# Patient Record
Sex: FEMALE | Race: WHITE | NOT HISPANIC OR LATINO | Employment: PART TIME | URBAN - NONMETROPOLITAN AREA
[De-identification: names, ages, dates, MRNs, and addresses within clinical notes are randomized per-mention and may not be internally consistent; named-entity substitution may affect disease eponyms.]

---
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
## 2021-01-19 ENCOUNTER — LAB (OUTPATIENT)
Dept: LAB | Facility: HOSPITAL | Age: 21
End: 2021-01-19

## 2021-01-19 ENCOUNTER — OFFICE VISIT (OUTPATIENT)
Dept: GASTROENTEROLOGY | Facility: CLINIC | Age: 21
End: 2021-01-19

## 2021-01-19 VITALS
BODY MASS INDEX: 48.48 KG/M2 | WEIGHT: 291 LBS | RESPIRATION RATE: 18 BRPM | HEART RATE: 90 BPM | HEIGHT: 65 IN | DIASTOLIC BLOOD PRESSURE: 77 MMHG | SYSTOLIC BLOOD PRESSURE: 139 MMHG | TEMPERATURE: 96.8 F

## 2021-01-19 DIAGNOSIS — R11.0 NAUSEA: Chronic | ICD-10-CM

## 2021-01-19 DIAGNOSIS — R10.13 EPIGASTRIC PAIN: Chronic | ICD-10-CM

## 2021-01-19 DIAGNOSIS — R10.13 EPIGASTRIC PAIN: Primary | Chronic | ICD-10-CM

## 2021-01-19 DIAGNOSIS — E66.01 CLASS 3 SEVERE OBESITY DUE TO EXCESS CALORIES WITHOUT SERIOUS COMORBIDITY WITH BODY MASS INDEX (BMI) OF 45.0 TO 49.9 IN ADULT (HCC): Chronic | ICD-10-CM

## 2021-01-19 DIAGNOSIS — R19.7 DIARRHEA, UNSPECIFIED TYPE: Chronic | ICD-10-CM

## 2021-01-19 PROBLEM — E66.813 CLASS 3 SEVERE OBESITY DUE TO EXCESS CALORIES WITHOUT SERIOUS COMORBIDITY WITH BODY MASS INDEX (BMI) OF 45.0 TO 49.9 IN ADULT: Status: ACTIVE | Noted: 2021-01-19

## 2021-01-19 LAB
DEPRECATED RDW RBC AUTO: 37.9 FL (ref 37–54)
ERYTHROCYTE [DISTWIDTH] IN BLOOD BY AUTOMATED COUNT: 13.1 % (ref 12.3–15.4)
HCT VFR BLD AUTO: 37.7 % (ref 34–46.6)
HGB BLD-MCNC: 12.8 G/DL (ref 12–15.9)
IGA1 MFR SER: 204 MG/DL (ref 61–348)
MCH RBC QN AUTO: 27.2 PG (ref 26.6–33)
MCHC RBC AUTO-ENTMCNC: 34 G/DL (ref 31.5–35.7)
MCV RBC AUTO: 80.2 FL (ref 79–97)
PLATELET # BLD AUTO: 390 10*3/MM3 (ref 140–450)
PMV BLD AUTO: 9.5 FL (ref 6–12)
RBC # BLD AUTO: 4.7 10*6/MM3 (ref 3.77–5.28)
WBC # BLD AUTO: 7.39 10*3/MM3 (ref 3.4–10.8)

## 2021-01-19 PROCEDURE — 36415 COLL VENOUS BLD VENIPUNCTURE: CPT

## 2021-01-19 PROCEDURE — 82784 ASSAY IGA/IGD/IGG/IGM EACH: CPT

## 2021-01-19 PROCEDURE — 83516 IMMUNOASSAY NONANTIBODY: CPT

## 2021-01-19 PROCEDURE — 80050 GENERAL HEALTH PANEL: CPT

## 2021-01-19 PROCEDURE — 99214 OFFICE O/P EST MOD 30 MIN: CPT | Performed by: NURSE PRACTITIONER

## 2021-01-19 RX ORDER — PANTOPRAZOLE SODIUM 40 MG/1
TABLET, DELAYED RELEASE ORAL
Qty: 30 TABLET | Refills: 3 | Status: SHIPPED | OUTPATIENT
Start: 2021-01-19 | End: 2021-04-19 | Stop reason: ALTCHOICE

## 2021-01-19 NOTE — PROGRESS NOTES
New Patient Consult      Date: 2021   Patient Name: Nina Munguia  MRN: [id number]  : 2000     Primary Care Provider: Coral Bernal PA    Chief Complaint   Patient presents with   • Abdominal Pain     History of Present Illness: Nina Munguia is a 20 y.o. female who is here today to establish care with Gastroenterology for abdominal pain.     She has been having upper abdominal pain since 2020. She has been to the ER 3 times for the same pain. She has the pain about 2-3 times per week. It seems to be a little better than before, but it has not resolved. At times the pain is mild and at times it is severe. Eating and bowel movements do not affect the pain. She has occasional nausea, no vomiting. No history of heartburn or reflux. No difficulty swallowing. She has not taken anything for the pain, other than Ibuprofen, which she takes as needed.    She has a history of diarrhea for several years. She has 3-4 episodes of loose stool 2-3 times per week. No rectal bleeding.     She has not had colonoscopy or EGD in the past. There is no family history of colon cancer. No family history of GI malignancy.    Subjective      Past Medical History:   Diagnosis Date   • Asthma    • PCOS (polycystic ovarian syndrome)    • Polycystic ovary syndrome      History reviewed. No pertinent surgical history.     Family History   Problem Relation Age of Onset   • Breast cancer Mother    • Prostate cancer Maternal Grandfather    • Irritable bowel syndrome Brother    • Colon cancer Neg Hx      Social History     Socioeconomic History   • Marital status: Single     Spouse name: Not on file   • Number of children: Not on file   • Years of education: Not on file   • Highest education level: Not on file   Tobacco Use   • Smoking status: Former Smoker     Quit date:      Years since quittin.0   • Smokeless tobacco: Never Used   Substance and Sexual Activity   • Alcohol use: Not Currently   •  Drug use: Never   • Sexual activity: Never     Birth control/protection: Abstinence, OCP       Current Outpatient Medications:   •  ondansetron ODT (ZOFRAN-ODT) 4 MG disintegrating tablet, Place 1 tablet on the tongue Every 8 (Eight) Hours As Needed for Nausea., Disp: 12 tablet, Rfl: 0  •  Sulfamethoxazole-Trimethoprim (BACTRIM PO), Take  by mouth Daily., Disp: , Rfl:   •  pantoprazole (PROTONIX) 40 MG EC tablet, 1 po daily in the am 30 minutes before breakfast, Disp: 30 tablet, Rfl: 3    No Known Allergies     The following portions of the patient's history were reviewed and updated as appropriate: allergies, current medications, past family history, past medical history, past social history, past surgical history and problem list.    Objective     Physical Exam  Vitals signs and nursing note reviewed.   Constitutional:       General: She is awake. She is not in acute distress.     Appearance: Normal appearance. She is well-developed. She is not diaphoretic.   HENT:      Head: Normocephalic and atraumatic.      Right Ear: Hearing and external ear normal.      Left Ear: Hearing and external ear normal.      Nose: Nose normal.      Mouth/Throat:      Mouth: Mucous membranes are not pale, not dry and not cyanotic.   Eyes:      General: Lids are normal.         Right eye: No discharge.         Left eye: No discharge.      Conjunctiva/sclera: Conjunctivae normal.   Neck:      Musculoskeletal: Neck supple. No edema.      Thyroid: No thyroid mass or thyromegaly.      Vascular: No JVD.      Trachea: Trachea normal.   Cardiovascular:      Rate and Rhythm: Normal rate and regular rhythm.      Heart sounds: Normal heart sounds and S2 normal. No murmur. No friction rub. No gallop. No S3 sounds.    Pulmonary:      Effort: Pulmonary effort is normal. No respiratory distress.      Breath sounds: Normal breath sounds.   Chest:      Chest wall: No tenderness.   Abdominal:      General: Bowel sounds are normal. There is no  "distension.      Palpations: Abdomen is not rigid. There is no hepatomegaly, splenomegaly or mass.      Tenderness: There is abdominal tenderness in the epigastric area. There is no guarding or rebound.      Hernia: No hernia is present.   Musculoskeletal: Normal range of motion.   Lymphadenopathy:      Cervical: No cervical adenopathy.      Upper Body:      Left upper body: No supraclavicular adenopathy.   Skin:     General: Skin is warm and dry.      Coloration: Skin is not pale.      Findings: No rash.      Nails: There is no clubbing.     Neurological:      Mental Status: She is alert and oriented to person, place, and time.      Cranial Nerves: No cranial nerve deficit.      Sensory: No sensory deficit.   Psychiatric:         Mood and Affect: Mood normal.         Speech: Speech normal.         Behavior: Behavior is cooperative.       Vital Signs:   Vitals:    01/19/21 1352   BP: 139/77   Pulse: 90   Resp: 18   Temp: 96.8 °F (36 °C)   Weight: 132 kg (291 lb)   Height: 165.1 cm (65\")     Results Review:   I have reviewed the patient's new clinical and imaging results.    Admission on 12/06/2020, Discharged on 12/07/2020   Component Date Value Ref Range Status   • Glucose 12/06/2020 104* 65 - 99 mg/dL Final   • BUN 12/06/2020 13  6 - 20 mg/dL Final   • Creatinine 12/06/2020 0.64  0.57 - 1.00 mg/dL Final   • Sodium 12/06/2020 142  136 - 145 mmol/L Final   • Potassium 12/06/2020 3.6  3.5 - 5.2 mmol/L Final   • Chloride 12/06/2020 103  98 - 107 mmol/L Final   • CO2 12/06/2020 27.0  22.0 - 29.0 mmol/L Final   • Calcium 12/06/2020 9.4  8.6 - 10.5 mg/dL Final   • Total Protein 12/06/2020 7.9  6.0 - 8.5 g/dL Final   • Albumin 12/06/2020 4.40  3.50 - 5.20 g/dL Final   • ALT (SGPT) 12/06/2020 22  1 - 33 U/L Final   • AST (SGOT) 12/06/2020 16  1 - 32 U/L Final   • Alkaline Phosphatase 12/06/2020 63  39 - 117 U/L Final   • Total Bilirubin 12/06/2020 <0.2  0.0 - 1.2 mg/dL Final   • eGFR Non African Amer 12/06/2020 120  >60 " mL/min/1.73 Final   • Globulin 12/06/2020 3.5  gm/dL Final   • A/G Ratio 12/06/2020 1.3  g/dL Final   • BUN/Creatinine Ratio 12/06/2020 20.3  7.0 - 25.0 Final   • Anion Gap 12/06/2020 12.0  5.0 - 15.0 mmol/L Final   • Lipase 12/06/2020 71* 13 - 60 U/L Final   • HCG, Urine, QL 12/06/2020 Negative  Negative Final   • Lot Number 12/06/2020 [id number]   Final   • Internal Positive Control 12/06/2020 Positive   Final   • Internal Negative Control 12/06/2020 Negative   Final   • Color, UA 12/06/2020 Yellow  Yellow, Straw Final   • Appearance, UA 12/06/2020 Clear  Clear Final   • pH, UA 12/06/2020 6.0  5.0 - 8.0 Final   • Specific Gravity, UA 12/06/2020 1.024  1.001 - 1.030 Final   • Glucose, UA 12/06/2020 Negative  Negative Final   • Ketones, UA 12/06/2020 Negative  Negative Final   • Bilirubin, UA 12/06/2020 Negative  Negative Final   • Blood, UA 12/06/2020 Large (3+)* Negative Final   • Protein, UA 12/06/2020 Negative  Negative Final   • Leuk Esterase, UA 12/06/2020 Moderate (2+)* Negative Final   • Nitrite, UA 12/06/2020 Negative  Negative Final   • Urobilinogen, UA 12/06/2020 1.0 E.U./dL  0.2 - 1.0 E.U./dL Final   • Lactate 12/06/2020 0.8  0.5 - 2.0 mmol/L Final    Falsely depressed results may occur on samples drawn from patients receiving N-Acetylcysteine (NAC) or Metamizole.   • Triglycerides 12/06/2020 112  0 - 150 mg/dL Final    Falsely depressed results may occur on samples drawn from patients receiving N-Acetylcysteine (NAC) or Metamizole.   • Amylase 12/06/2020 57  28 - 100 U/L Final   • WBC 12/06/2020 10.73  3.40 - 10.80 10*3/mm3 Final   • RBC 12/06/2020 4.87  3.77 - 5.28 10*6/mm3 Final   • Hemoglobin 12/06/2020 13.3  12.0 - 15.9 g/dL Final   • Hematocrit 12/06/2020 40.9  34.0 - 46.6 % Final   • MCV 12/06/2020 84.0  79.0 - 97.0 fL Final   • MCH 12/06/2020 27.3  26.6 - 33.0 pg Final   • MCHC 12/06/2020 32.5  31.5 - 35.7 g/dL Final   • RDW 12/06/2020 12.9  12.3 - 15.4 % Final   • RDW-SD 12/06/2020 39.1  37.0 - 54.0  fl Final   • MPV 12/06/2020 9.0  6.0 - 12.0 fL Final   • Platelets 12/06/2020 408  140 - 450 10*3/mm3 Final   • Neutrophil % 12/06/2020 62.9  42.7 - 76.0 % Final   • Lymphocyte % 12/06/2020 28.6  19.6 - 45.3 % Final   • Monocyte % 12/06/2020 5.2  5.0 - 12.0 % Final   • Eosinophil % 12/06/2020 2.4  0.3 - 6.2 % Final   • Basophil % 12/06/2020 0.6  0.0 - 1.5 % Final   • Immature Grans % 12/06/2020 0.3  0.0 - 0.5 % Final   • Neutrophils, Absolute 12/06/2020 6.75  1.70 - 7.00 10*3/mm3 Final   • Lymphocytes, Absolute 12/06/2020 3.07  0.70 - 3.10 10*3/mm3 Final   • Monocytes, Absolute 12/06/2020 0.56  0.10 - 0.90 10*3/mm3 Final   • Eosinophils, Absolute 12/06/2020 0.26  0.00 - 0.40 10*3/mm3 Final   • Basophils, Absolute 12/06/2020 0.06  0.00 - 0.20 10*3/mm3 Final   • Immature Grans, Absolute 12/06/2020 0.03  0.00 - 0.05 10*3/mm3 Final   • nRBC 12/06/2020 0.0  0.0 - 0.2 /100 WBC Final   • Extra Tube 12/06/2020 hold for add-on   Final    Auto resulted   • Extra Tube 12/06/2020 Hold for add-ons.   Final    Auto resulted.   • RBC, UA 12/06/2020 7-12* None Seen, 0-2 /HPF Final   • WBC, UA 12/06/2020 31-50* None Seen, 0-2 /HPF Final   • Bacteria, UA 12/06/2020 1+* None Seen, Trace /HPF Final   • Squamous Epithelial Cells, UA 12/06/2020 0-2  None Seen, 0-2 /HPF Final   • Hyaline Casts, UA 12/06/2020 0-6  0 - 6 /LPF Final   • Methodology 12/06/2020 Automated Microscopy   Final   • Urine Culture 12/06/2020 >100,000 CFU/mL Mixed Iqra Isolated   Final   Admission on 11/23/2020, Discharged on 11/23/2020   Component Date Value Ref Range Status   • Glucose 11/23/2020 90  65 - 99 mg/dL Final   • BUN 11/23/2020 13  6 - 20 mg/dL Final   • Creatinine 11/23/2020 0.68  0.57 - 1.00 mg/dL Final   • Sodium 11/23/2020 138  136 - 145 mmol/L Final   • Potassium 11/23/2020 3.9  3.5 - 5.2 mmol/L Final   • Chloride 11/23/2020 105  98 - 107 mmol/L Final   • CO2 11/23/2020 25.6  22.0 - 29.0 mmol/L Final   • Calcium 11/23/2020 9.4  8.6 - 10.5 mg/dL  Final   • Total Protein 11/23/2020 7.3  6.0 - 8.5 g/dL Final   • Albumin 11/23/2020 3.90  3.50 - 5.20 g/dL Final   • ALT (SGPT) 11/23/2020 22  1 - 33 U/L Final   • AST (SGOT) 11/23/2020 15  1 - 32 U/L Final   • Alkaline Phosphatase 11/23/2020 57  39 - 117 U/L Final   • Total Bilirubin 11/23/2020 0.2  0.0 - 1.2 mg/dL Final   • eGFR Non African Amer 11/23/2020 111  >60 mL/min/1.73 Final   • Globulin 11/23/2020 3.4  gm/dL Final   • A/G Ratio 11/23/2020 1.1  g/dL Final   • BUN/Creatinine Ratio 11/23/2020 19.1  7.0 - 25.0 Final   • Anion Gap 11/23/2020 7.4  5.0 - 15.0 mmol/L Final   • Lipase 11/23/2020 58  13 - 60 U/L Final   • HCG, Urine QL 11/23/2020 Negative  Negative Final   • Color, UA 11/23/2020 Yellow  Yellow, Straw Final   • Appearance, UA 11/23/2020 Clear  Clear Final   • pH, UA 11/23/2020 8.0  5.0 - 8.0 Final   • Specific Gravity, UA 11/23/2020 1.022  1.005 - 1.030 Final   • Glucose, UA 11/23/2020 Negative  Negative Final   • Ketones, UA 11/23/2020 Negative  Negative Final   • Bilirubin, UA 11/23/2020 Negative  Negative Final   • Blood, UA 11/23/2020 Negative  Negative Final   • Protein, UA 11/23/2020 Negative  Negative Final   • Leuk Esterase, UA 11/23/2020 Negative  Negative Final   • Nitrite, UA 11/23/2020 Negative  Negative Final   • Urobilinogen, UA 11/23/2020 0.2 E.U./dL  0.2 - 1.0 E.U./dL Final   • WBC 11/23/2020 9.36  3.40 - 10.80 10*3/mm3 Final   • RBC 11/23/2020 4.83  3.77 - 5.28 10*6/mm3 Final   • Hemoglobin 11/23/2020 13.0  12.0 - 15.9 g/dL Final   • Hematocrit 11/23/2020 39.5  34.0 - 46.6 % Final   • MCV 11/23/2020 81.8  79.0 - 97.0 fL Final   • MCH 11/23/2020 26.9  26.6 - 33.0 pg Final   • MCHC 11/23/2020 32.9  31.5 - 35.7 g/dL Final   • RDW 11/23/2020 13.1  12.3 - 15.4 % Final   • RDW-SD 11/23/2020 39.2  37.0 - 54.0 fl Final   • MPV 11/23/2020 9.0  6.0 - 12.0 fL Final   • Platelets 11/23/2020 334  140 - 450 10*3/mm3 Final   • Neutrophil % 11/23/2020 65.0  42.7 - 76.0 % Final   • Lymphocyte %  11/23/2020 25.6  19.6 - 45.3 % Final   • Monocyte % 11/23/2020 6.2  5.0 - 12.0 % Final   • Eosinophil % 11/23/2020 2.8  0.3 - 6.2 % Final   • Basophil % 11/23/2020 0.2  0.0 - 1.5 % Final   • Immature Grans % 11/23/2020 0.2  0.0 - 0.5 % Final   • Neutrophils, Absolute 11/23/2020 6.08  1.70 - 7.00 10*3/mm3 Final   • Lymphocytes, Absolute 11/23/2020 2.40  0.70 - 3.10 10*3/mm3 Final   • Monocytes, Absolute 11/23/2020 0.58  0.10 - 0.90 10*3/mm3 Final   • Eosinophils, Absolute 11/23/2020 0.26  0.00 - 0.40 10*3/mm3 Final   • Basophils, Absolute 11/23/2020 0.02  0.00 - 0.20 10*3/mm3 Final   • Immature Grans, Absolute 11/23/2020 0.02  0.00 - 0.05 10*3/mm3 Final   • nRBC 11/23/2020 0.0  0.0 - 0.2 /100 WBC Final   Admission on 11/20/2020, Discharged on 11/21/2020   Component Date Value Ref Range Status   • Glucose 11/21/2020 104* 65 - 99 mg/dL Final   • BUN 11/21/2020 12  6 - 20 mg/dL Final   • Creatinine 11/21/2020 0.73  0.57 - 1.00 mg/dL Final   • Sodium 11/21/2020 140  136 - 145 mmol/L Final   • Potassium 11/21/2020 3.5  3.5 - 5.2 mmol/L Final   • Chloride 11/21/2020 106  98 - 107 mmol/L Final   • CO2 11/21/2020 25.4  22.0 - 29.0 mmol/L Final   • Calcium 11/21/2020 8.9  8.6 - 10.5 mg/dL Final   • Total Protein 11/21/2020 6.6  6.0 - 8.5 g/dL Final   • Albumin 11/21/2020 3.90  3.50 - 5.20 g/dL Final   • ALT (SGPT) 11/21/2020 16  1 - 33 U/L Final   • AST (SGOT) 11/21/2020 11  1 - 32 U/L Final   • Alkaline Phosphatase 11/21/2020 54  39 - 117 U/L Final   • Total Bilirubin 11/21/2020 <0.2  0.0 - 1.2 mg/dL Final   • eGFR Non African Amer 11/21/2020 103  >60 mL/min/1.73 Final   • Globulin 11/21/2020 2.7  gm/dL Final   • A/G Ratio 11/21/2020 1.4  g/dL Final   • BUN/Creatinine Ratio 11/21/2020 16.4  7.0 - 25.0 Final   • Anion Gap 11/21/2020 8.6  5.0 - 15.0 mmol/L Final   • Lipase 11/21/2020 68* 13 - 60 U/L Final   • Color, UA 11/20/2020 Yellow  Yellow, Straw Final   • Appearance, UA 11/20/2020 Clear  Clear Final   • pH, UA 11/20/2020  5.5  5.0 - 8.0 Final   • Specific Gravity, UA 11/20/2020 >=1.030  1.005 - 1.030 Final   • Glucose, UA 11/20/2020 Negative  Negative Final   • Ketones, UA 11/20/2020 Trace* Negative Final   • Bilirubin, UA 11/20/2020 Negative  Negative Final   • Blood, UA 11/20/2020 Small (1+)* Negative Final   • Protein, UA 11/20/2020 Negative  Negative Final   • Leuk Esterase, UA 11/20/2020 Negative  Negative Final   • Nitrite, UA 11/20/2020 Negative  Negative Final   • Urobilinogen, UA 11/20/2020 0.2 E.U./dL  0.2 - 1.0 E.U./dL Final   • HCG, Urine QL 11/20/2020 Negative  Negative Final   • WBC 11/21/2020 14.38* 3.40 - 10.80 10*3/mm3 Final   • RBC 11/21/2020 4.65  3.77 - 5.28 10*6/mm3 Final   • Hemoglobin 11/21/2020 12.7  12.0 - 15.9 g/dL Final   • Hematocrit 11/21/2020 38.3  34.0 - 46.6 % Final   • MCV 11/21/2020 82.4  79.0 - 97.0 fL Final   • MCH 11/21/2020 27.3  26.6 - 33.0 pg Final   • MCHC 11/21/2020 33.2  31.5 - 35.7 g/dL Final   • RDW 11/21/2020 13.4  12.3 - 15.4 % Final   • RDW-SD 11/21/2020 39.8  37.0 - 54.0 fl Final   • MPV 11/21/2020 9.2  6.0 - 12.0 fL Final   • Platelets 11/21/2020 353  140 - 450 10*3/mm3 Final   • Neutrophil % 11/21/2020 63.1  42.7 - 76.0 % Final   • Lymphocyte % 11/21/2020 28.9  19.6 - 45.3 % Final   • Monocyte % 11/21/2020 6.3  5.0 - 12.0 % Final   • Eosinophil % 11/21/2020 1.0  0.3 - 6.2 % Final   • Basophil % 11/21/2020 0.4  0.0 - 1.5 % Final   • Immature Grans % 11/21/2020 0.3  0.0 - 0.5 % Final   • Neutrophils, Absolute 11/21/2020 9.06* 1.70 - 7.00 10*3/mm3 Final   • Lymphocytes, Absolute 11/21/2020 4.15* 0.70 - 3.10 10*3/mm3 Final   • Monocytes, Absolute 11/21/2020 0.91* 0.10 - 0.90 10*3/mm3 Final   • Eosinophils, Absolute 11/21/2020 0.15  0.00 - 0.40 10*3/mm3 Final   • Basophils, Absolute 11/21/2020 0.06  0.00 - 0.20 10*3/mm3 Final   • Immature Grans, Absolute 11/21/2020 0.05  0.00 - 0.05 10*3/mm3 Final   • nRBC 11/21/2020 0.0  0.0 - 0.2 /100 WBC Final   • RBC, UA 11/20/2020 0-2* None Seen /HPF  Final   • WBC, UA 11/20/2020 0-2* None Seen /HPF Final   • Bacteria, UA 11/20/2020 Trace* None Seen /HPF Final   • Squamous Epithelial Cells, UA 11/20/2020 0-2  None Seen, 0-2 /HPF Final   • Hyaline Casts, UA 11/20/2020 None Seen  None Seen /LPF Final   • Calcium Oxalate Crystals, UA 11/20/2020 Small/1+  None Seen /HPF Final   • Methodology 11/20/2020 Manual Light Microscopy   Final      Ct Abdomen Pelvis With Contrast    Result Date: 12/6/2020  No acute process. Few scattered prominent central mesenteric lymph nodes seen, nonspecific and possibly self limited to mild gastroenteritis, correlate clinically. No urolithiasis. Signer Name: Sumaya Dillard MD  Signed: 12/6/2020 9:36 PM  Workstation Name: [facility]-  [facility]    Ct Abdomen Pelvis With Contrast    Result Date: 11/21/2020  No mass or localized inflammatory process identified.  Right renal atrophy and scarring.  This report was finalized on 11/21/2020 6:33 AM by Phuc Bajwa MD.    Assessment / Plan      1. Epigastric pain  2. Nausea  History of epigastric pain with nausea since November 2020 2-3 times per week that has gradually improved but not resolved.  No vomiting.  No melena.  She has been to the emergency room 3 times for the same.  Eating does not affect her symptoms.  Labs unremarkable.  CTAP in November and December 2020 unremarkable.  She has not taken anything in the past for her symptoms, other than Zofran and ibuprofen as needed.  Basic labs, TSH, celiac panel  Abdominal US  Pantoprazole 40 mg daily x 3 months  EGD in the future if no improvement.    - US Gallbladder; Future  - CBC (No Diff); Future  - Comprehensive Metabolic Panel; Future  - TSH; Future  - IgA; Future  - Tissue Transglutaminase, IgA; Future  - pantoprazole (PROTONIX) 40 MG EC tablet; 1 po daily in the am 30 minutes before breakfast  Dispense: 30 tablet; Refill: 3    3. Diarrhea, unspecified type  History of diarrhea for several years.  She has  3-4 episodes of loose stool 2-3 times per week.  No rectal bleeding. Basic labs unremarkable. CTAP x 2 unremarkable. Suspect IBS-D.  Celiac panel, TSH  Low FODMAP diet. Avoid dairy.    4. Class 3 severe obesity due to excess calories without serious comorbidity with body mass index (BMI) of 45.0 to 49.9 in adult (CMS/[facility])  BMI 48.4.  She has a history of PCOS.  No history of elevated liver enzymes.  Triglyceride level normal.  CTAP of liver unremarkable.  High fiber, low fat diet with liberal water intake.   Advised to exercise 30 minutes 3-4 days per week.  Advised to lose 25-30 pounds in the next 6-12 months.  She declines referral to nutritional services at this time as she has been to many nutritionists over the years regarding her weight. She is agreeable to be referred to bariatric surgery in the future if she is unable to lose weight on her own.     Patient Instructions   Antireflux measures: Avoid fried, fatty foods, alcohol, chocolate, coffee, tea,  soft drinks, peppermint and spearmint, spicy foods, tomatoes and tomato based foods, onion based foods, and smoking. Other antireflux measures include weight reduction if overweight, avoiding tight clothing around the abdomen, elevating the head of the bed 6 inches with blocks under the head board, and don't drink or eat before going to bed and avoid lying down immediately after meals.  Pantoprazole 40 mg 1 by mouth in the am 30 minutes before breakfast.  Zofran 4 mg 1 po every 8 hours as needed for nausea.  Labs  Abdominal ultrasound  High fiber, low fat diet with liberal water intake.   Low FODMAP diet. Avoid dairy.  Advised to exercise 30 minutes 3-4 days per week.  Advised to lose 25-30 pounds in the next 6-12 months.  EGD in the future if no improvement.   Follow up: 3 months or sooner symptoms worsen     Gabriella Marrufo, APRN  1/19/2021    Please note that portions of this note may have been completed with a voice recognition program. Efforts were made  to edit the dictations, but occasionally words are mistranscribed.

## 2021-01-19 NOTE — PATIENT INSTRUCTIONS
Antireflux measures: Avoid fried, fatty foods, alcohol, chocolate, coffee, tea,  soft drinks, peppermint and spearmint, spicy foods, tomatoes and tomato based foods, onion based foods, and smoking. Other antireflux measures include weight reduction if overweight, avoiding tight clothing around the abdomen, elevating the head of the bed 6 inches with blocks under the head board, and don't drink or eat before going to bed and avoid lying down immediately after meals.  Pantoprazole 40 mg 1 by mouth in the am 30 minutes before breakfast.  Zofran 4 mg 1 po every 8 hours as needed for nausea.  Labs  Abdominal ultrasound  High fiber, low fat diet with liberal water intake.   Low FODMAP diet. Avoid dairy.  Advised to exercise 30 minutes 3-4 days per week.  Advised to lose 25-30 pounds in the next 6-12 months.  EGD in the future if no improvement.   Follow up: 3 months or sooner symptoms worsen

## 2021-01-20 LAB
ALBUMIN SERPL-MCNC: 4.6 G/DL (ref 3.5–5.2)
ALBUMIN/GLOB SERPL: 1.6 G/DL
ALP SERPL-CCNC: 53 U/L (ref 39–117)
ALT SERPL W P-5'-P-CCNC: 31 U/L (ref 1–33)
ANION GAP SERPL CALCULATED.3IONS-SCNC: 12.1 MMOL/L (ref 5–15)
AST SERPL-CCNC: 22 U/L (ref 1–32)
BILIRUB SERPL-MCNC: 0.2 MG/DL (ref 0–1.2)
BUN SERPL-MCNC: 9 MG/DL (ref 6–20)
BUN/CREAT SERPL: 12.7 (ref 7–25)
CALCIUM SPEC-SCNC: 9.2 MG/DL (ref 8.6–10.5)
CHLORIDE SERPL-SCNC: 109 MMOL/L (ref 98–107)
CO2 SERPL-SCNC: 20.9 MMOL/L (ref 22–29)
CREAT SERPL-MCNC: 0.71 MG/DL (ref 0.57–1)
GFR SERPL CREATININE-BSD FRML MDRD: 106 ML/MIN/1.73
GLOBULIN UR ELPH-MCNC: 2.9 GM/DL
GLUCOSE SERPL-MCNC: 80 MG/DL (ref 65–99)
POTASSIUM SERPL-SCNC: 4.3 MMOL/L (ref 3.5–5.2)
PROT SERPL-MCNC: 7.5 G/DL (ref 6–8.5)
SODIUM SERPL-SCNC: 142 MMOL/L (ref 136–145)
TSH SERPL DL<=0.05 MIU/L-ACNC: 0.93 UIU/ML (ref 0.27–4.2)
TTG IGA SER-ACNC: <2 U/ML (ref 0–3)

## 2021-02-04 ENCOUNTER — APPOINTMENT (OUTPATIENT)
Dept: ULTRASOUND IMAGING | Facility: HOSPITAL | Age: 21
End: 2021-02-04

## 2021-02-12 ENCOUNTER — APPOINTMENT (OUTPATIENT)
Dept: ULTRASOUND IMAGING | Facility: HOSPITAL | Age: 21
End: 2021-02-12

## 2021-02-22 ENCOUNTER — HOSPITAL ENCOUNTER (OUTPATIENT)
Dept: ULTRASOUND IMAGING | Facility: HOSPITAL | Age: 21
Discharge: HOME | End: 2021-02-22
Admitting: NURSE PRACTITIONER

## 2021-02-22 DIAGNOSIS — R11.0 NAUSEA: Chronic | ICD-10-CM

## 2021-02-22 DIAGNOSIS — R10.13 EPIGASTRIC PAIN: Chronic | ICD-10-CM

## 2021-02-22 PROCEDURE — 76705 ECHO EXAM OF ABDOMEN: CPT

## 2021-04-08 ENCOUNTER — IMMUNIZATION (OUTPATIENT)
Dept: VACCINE CLINIC | Facility: HOSPITAL | Age: 21
End: 2021-04-08

## 2021-04-08 PROCEDURE — 0001A: CPT | Performed by: INTERNAL MEDICINE

## 2021-04-08 PROCEDURE — 91300 HC SARSCOV02 VAC 30MCG/0.3ML IM: CPT | Performed by: INTERNAL MEDICINE

## 2021-04-19 ENCOUNTER — OFFICE VISIT (OUTPATIENT)
Dept: GASTROENTEROLOGY | Facility: CLINIC | Age: 21
End: 2021-04-19

## 2021-04-19 VITALS
HEART RATE: 73 BPM | HEIGHT: 65 IN | TEMPERATURE: 98.4 F | DIASTOLIC BLOOD PRESSURE: 66 MMHG | RESPIRATION RATE: 18 BRPM | BODY MASS INDEX: 48.82 KG/M2 | WEIGHT: 293 LBS | SYSTOLIC BLOOD PRESSURE: 131 MMHG

## 2021-04-19 DIAGNOSIS — R19.7 DIARRHEA, UNSPECIFIED TYPE: Chronic | ICD-10-CM

## 2021-04-19 DIAGNOSIS — K80.20 GALLSTONES: Chronic | ICD-10-CM

## 2021-04-19 DIAGNOSIS — E66.01 CLASS 3 SEVERE OBESITY DUE TO EXCESS CALORIES WITHOUT SERIOUS COMORBIDITY WITH BODY MASS INDEX (BMI) OF 45.0 TO 49.9 IN ADULT (HCC): Chronic | ICD-10-CM

## 2021-04-19 DIAGNOSIS — R10.13 EPIGASTRIC PAIN: Primary | Chronic | ICD-10-CM

## 2021-04-19 DIAGNOSIS — R11.0 NAUSEA: Chronic | ICD-10-CM

## 2021-04-19 PROCEDURE — 99214 OFFICE O/P EST MOD 30 MIN: CPT | Performed by: NURSE PRACTITIONER

## 2021-04-19 RX ORDER — CIPROFLOXACIN 500 MG/1
500 TABLET, FILM COATED ORAL 2 TIMES DAILY
COMMUNITY
End: 2021-10-26

## 2021-04-19 RX ORDER — OMEPRAZOLE 40 MG/1
CAPSULE, DELAYED RELEASE ORAL
Qty: 30 CAPSULE | Refills: 2 | Status: SHIPPED | OUTPATIENT
Start: 2021-04-19 | End: 2021-07-19

## 2021-04-19 NOTE — PATIENT INSTRUCTIONS
Antireflux measures: Avoid fried, fatty foods, alcohol, chocolate, coffee, tea,  soft drinks, peppermint and spearmint, spicy foods, tomatoes and tomato based foods, onion based foods, and smoking. Other antireflux measures include weight reduction if overweight, avoiding tight clothing around the abdomen, elevating the head of the bed 6 inches with blocks under the head board, and don't drink or eat before going to bed and avoid lying down immediately after meals.  Pantoprazole 40 mg 1 by mouth in the am 30 minutes before breakfast.  Zofran 4 mg 1 po every 8 hours as needed for nausea.   High fiber, low fat diet with liberal water intake.    Advised to exercise 30 minutes 3-4 days per week.   Advised to lose 25-30 pounds in the next 6-12 months.   Referral to bariatric surgery for evaluation.  EGD in the future if no improvement.   Follow up: 3 months or sooner if symptoms worsen

## 2021-04-19 NOTE — PROGRESS NOTES
Follow Up Note     Date: 2021   Patient Name: Nina Munguia  MRN: [id number]  : 2000     Primary Care Provider: Coral Bernal PA     Chief Complaint   Patient presents with   • Follow-up   • Abdominal Pain     History of present illness:   2021  Nina Munguia is a 20 y.o. female who is here today for follow up for abdominal pain.     She was not able to get the PPI after her last visit as her pharmacy never contacted her about picking it up. Her symptoms have not worsened, they may have improved a little. No vomiting. She has not needed to go back to the ER. She has not lost any weight, she has gained about 3 pounds since her last visit.    Interval History:  2021  She has been having upper abdominal pain since 2020. She has been to the ER 3 times for the same pain. She has the pain about 2-3 times per week. It seems to be a little better than before, but it has not resolved. At times the pain is mild and at times it is severe. Eating and bowel movements do not affect the pain. She has occasional nausea, no vomiting. No history of heartburn or reflux. No difficulty swallowing. She has not taken anything for the pain, other than Ibuprofen, which she takes as needed.     She has a history of diarrhea for several years. She has 3-4 episodes of loose stool 2-3 times per week. No rectal bleeding.      She has not had colonoscopy or EGD in the past. There is no family history of colon cancer. No family history of GI malignancy.    Subjective      Past Medical History:   Diagnosis Date   • Asthma    • PCOS (polycystic ovarian syndrome)    • Polycystic ovary syndrome      History reviewed. No pertinent surgical history.  Family History   Problem Relation Age of Onset   • Breast cancer Mother    • Prostate cancer Maternal Grandfather    • Irritable bowel syndrome Brother    • Colon cancer Neg Hx      Social History     Socioeconomic History   • Marital status: Single      Spouse name: Not on file   • Number of children: Not on file   • Years of education: Not on file   • Highest education level: Not on file   Tobacco Use   • Smoking status: Former Smoker     Quit date:      Years since quittin.2   • Smokeless tobacco: Never Used   Vaping Use   • Vaping Use: Never used   Substance and Sexual Activity   • Alcohol use: Not Currently   • Drug use: Never   • Sexual activity: Never     Birth control/protection: Abstinence, OCP       Current Outpatient Medications:   •  ciprofloxacin (CIPRO) 500 MG tablet, Take 500 mg by mouth 2 (Two) Times a Day., Disp: , Rfl:   •  ondansetron ODT (ZOFRAN-ODT) 4 MG disintegrating tablet, Place 1 tablet on the tongue Every 8 (Eight) Hours As Needed for Nausea., Disp: 12 tablet, Rfl: 0  •  omeprazole (priLOSEC) 40 MG capsule, 1 po daily in the am 30 minutes before breakfast, Disp: 30 capsule, Rfl: 2     No Known Allergies     The following portions of the patient's history were reviewed and updated as appropriate: allergies, current medications, past family history, past medical history, past social history, past surgical history and problem list.  Objective     Physical Exam  Vitals and nursing note reviewed.   Constitutional:       General: She is awake. She is not in acute distress.     Appearance: Normal appearance. She is well-developed. She is not diaphoretic.   HENT:      Head: Normocephalic and atraumatic.      Right Ear: Hearing and external ear normal.      Left Ear: Hearing and external ear normal.      Nose: Nose normal.      Mouth/Throat:      Mouth: Mucous membranes are not pale, not dry and not cyanotic.   Eyes:      General: Lids are normal.         Right eye: No discharge.         Left eye: No discharge.      Conjunctiva/sclera: Conjunctivae normal.   Neck:      Thyroid: No thyroid mass or thyromegaly.      Vascular: No JVD.      Trachea: Trachea normal.   Cardiovascular:      Rate and Rhythm: Normal rate and regular rhythm.       "Heart sounds: Normal heart sounds and S2 normal. No murmur heard.   No friction rub. No gallop. No S3 sounds.    Pulmonary:      Effort: Pulmonary effort is normal. No respiratory distress.      Breath sounds: Normal breath sounds.   Chest:      Chest wall: No tenderness.   Abdominal:      General: Bowel sounds are normal. There is no distension.      Palpations: Abdomen is not rigid. There is no hepatomegaly, splenomegaly or mass.      Tenderness: There is no abdominal tenderness. There is no guarding or rebound.      Hernia: No hernia is present.   Musculoskeletal:         General: Normal range of motion.      Cervical back: Neck supple. No edema.   Lymphadenopathy:      Cervical: No cervical adenopathy.      Upper Body:      Left upper body: No supraclavicular adenopathy.   Skin:     General: Skin is warm and dry.      Coloration: Skin is not pale.      Findings: No rash.      Nails: There is no clubbing.   Neurological:      Mental Status: She is alert and oriented to person, place, and time.      Cranial Nerves: No cranial nerve deficit.      Sensory: No sensory deficit.   Psychiatric:         Mood and Affect: Mood normal.         Speech: Speech normal.         Behavior: Behavior is cooperative.       Vitals:    04/19/21 1432   BP: 131/66   Pulse: 73   Resp: 18   Temp: 98.4 °F (36.9 °C)   Weight: 133 kg (294 lb)   Height: 165.1 cm (65\")     Results Review:   I reviewed the patient's new clinical results.    No visits with results within 90 Day(s) from this visit.   Latest known visit with results is:   Lab on 01/19/2021   Component Date Value Ref Range Status   • WBC 01/19/2021 7.39  3.40 - 10.80 10*3/mm3 Final   • RBC 01/19/2021 4.70  3.77 - 5.28 10*6/mm3 Final   • Hemoglobin 01/19/2021 12.8  12.0 - 15.9 g/dL Final   • Hematocrit 01/19/2021 37.7  34.0 - 46.6 % Final   • MCV 01/19/2021 80.2  79.0 - 97.0 fL Final   • MCH 01/19/2021 27.2  26.6 - 33.0 pg Final   • MCHC 01/19/2021 34.0  31.5 - 35.7 g/dL Final   • " RDW 01/19/2021 13.1  12.3 - 15.4 % Final   • RDW-SD 01/19/2021 37.9  37.0 - 54.0 fl Final   • MPV 01/19/2021 9.5  6.0 - 12.0 fL Final   • Platelets 01/19/2021 390  140 - 450 10*3/mm3 Final   • Glucose 01/19/2021 80  65 - 99 mg/dL Final   • BUN 01/19/2021 9  6 - 20 mg/dL Final   • Creatinine 01/19/2021 0.71  0.57 - 1.00 mg/dL Final   • Sodium 01/19/2021 142  136 - 145 mmol/L Final   • Potassium 01/19/2021 4.3  3.5 - 5.2 mmol/L Final   • Chloride 01/19/2021 109* 98 - 107 mmol/L Final   • CO2 01/19/2021 20.9* 22.0 - 29.0 mmol/L Final   • Calcium 01/19/2021 9.2  8.6 - 10.5 mg/dL Final   • Total Protein 01/19/2021 7.5  6.0 - 8.5 g/dL Final   • Albumin 01/19/2021 4.60  3.50 - 5.20 g/dL Final   • ALT (SGPT) 01/19/2021 31  1 - 33 U/L Final   • AST (SGOT) 01/19/2021 22  1 - 32 U/L Final   • Alkaline Phosphatase 01/19/2021 53  39 - 117 U/L Final   • Total Bilirubin 01/19/2021 0.2  0.0 - 1.2 mg/dL Final   • eGFR Non African Amer 01/19/2021 106  >60 mL/min/1.73 Final   • Globulin 01/19/2021 2.9  gm/dL Final   • A/G Ratio 01/19/2021 1.6  g/dL Final   • BUN/Creatinine Ratio 01/19/2021 12.7  7.0 - 25.0 Final   • Anion Gap 01/19/2021 12.1  5.0 - 15.0 mmol/L Final   • TSH 01/19/2021 0.926  0.270 - 4.200 uIU/mL Final   • IgA 01/19/2021 204  61 - 348 mg/dL Final   • Tissue Transglutaminase IgA 01/19/2021 <2  0 - 3 U/mL Final                                  Negative        0 -  3                                Weak Positive   4 - 10                                Positive           >10   Tissue Transglutaminase (tTG) has been identified   as the endomysial antigen.  Studies have demonstr-   ated that endomysial IgA antibodies have over 99%   specificity for gluten sensitive enteropathy.      US Gallbladder     Result Date: 2/22/2021  1. Gallstones with no gallbladder wall thickening or biliary dilatation. 2. Fatty infiltration liver. 3. Atrophic right kidney.  This report was finalized on 2/22/2021 8:21 AM by Everardo Rod,  M.D..     Ct Abdomen Pelvis With Contrast    Result Date: 11/21/2020  No mass or localized inflammatory process identified.  Right renal atrophy and scarring.  This report was finalized on 11/21/2020 6:33 AM by Phcu Bajwa MD.    Ct Abdomen Pelvis With Contrast    Result Date: 12/6/2020  No acute process. Few scattered prominent central mesenteric lymph nodes seen, nonspecific and possibly self limited to mild gastroenteritis, correlate clinically. No urolithiasis. Signer Name: Sumaya Dillard MD  Signed: 12/6/2020 9:36 PM  Workstation Name: [email or web address]  [facility]    Assessment / Plan      1. Epigastric pain  2. Nausea  3. Gallstones  Patient did not start the PPI as her pharmacy did not contact her to pick it up.  Epigastric pain and nausea may be a little better than previous. No vomiting.  Abdominal ultrasound with gallstones noted, but there is no biliary dilatation or gallbladder wall thickening noted.  Symptoms are not associated with eating and does not appear to be appear to be related to gallstones. Basic labs unremarkable. TSH normal. Celiac panel negative.   Omeprazole 40 mg 1 po daily x 3 months  Zofran as needed for nausea.  EGD in the future if no improvement.    - omeprazole (priLOSEC) 40 MG capsule; 1 po daily in the am 30 minutes before breakfast  Dispense: 30 capsule; Refill: 2    1/19/2021  History of epigastric pain with nausea since November 2020 2-3 times per week that has gradually improved but not resolved.  No vomiting.  No melena.  She has been to the emergency room 3 times for the same.  Eating does not affect her symptoms.  Labs unremarkable.  CTAP in November and December 2020 unremarkable.  She has not taken anything in the past for her symptoms, other than Zofran and ibuprofen as needed.  Basic labs, TSH, celiac panel  Abdominal US  Pantoprazole 40 mg daily x 3 months  EGD in the future if no improvement.    4. Diarrhea, unspecified type  Unchanged. No  rectal bleeding. Basic labs unremarkable. Celiac panel negative. TSH normal. Suspect IBS-D.  Low FODMAP diet - avoid dairy.  Colonoscopy in the future if symptoms worsen    1/19/2021  History of diarrhea for several years.  She has 3-4 episodes of loose stool 2-3 times per week.  No rectal bleeding. Basic labs unremarkable. CTAP x 2 unremarkable. Suspect IBS-D.  Celiac panel, TSH  Low FODMAP diet. Avoid dairy.    5. Class 3 severe obesity due to excess calories without serious comorbidity with body mass index (BMI) of 45.0 to 49.9 in adult (CMS/[facility])  BMI 48.9  She has gained 3 pounds since her last visit. She has been unable to lose weight on her own. She would like referral to bariatrics for evaluation.  High fiber, low fat diet with liberal water intake.   Advised to exercise 30 minutes 3-4 days per week.  Advised to lose 25-30 pounds in the next 6-12 months.    - Ambulatory Referral to Bariatric Surgery    1/19/2021  BMI 48.4.  She has a history of PCOS.  No history of elevated liver enzymes.  Triglyceride level normal.  CTAP of liver unremarkable.  High fiber, low fat diet with liberal water intake.   Advised to exercise 30 minutes 3-4 days per week.  Advised to lose 25-30 pounds in the next 6-12 months.  She declines referral to nutritional services at this time as she has been to many nutritionists over the years regarding her weight. She is agreeable to be referred to bariatric surgery in the future if she is unable to lose weight on her own.     Patient Instructions   Antireflux measures: Avoid fried, fatty foods, alcohol, chocolate, coffee, tea,  soft drinks, peppermint and spearmint, spicy foods, tomatoes and tomato based foods, onion based foods, and smoking. Other antireflux measures include weight reduction if overweight, avoiding tight clothing around the abdomen, elevating the head of the bed 6 inches with blocks under the head board, and don't drink or eat before going to bed and avoid lying down  immediately after meals.  Pantoprazole 40 mg 1 by mouth in the am 30 minutes before breakfast.  Zofran 4 mg 1 po every 8 hours as needed for nausea.   High fiber, low fat diet with liberal water intake.    Advised to exercise 30 minutes 3-4 days per week.   Advised to lose 25-30 pounds in the next 6-12 months.   Referral to bariatric surgery for evaluation.  EGD in the future if no improvement.   Follow up: 3 months or sooner if symptoms worsen    Gabriella Marrufo, APRN  4/19/2021    Please note that portions of this note may have been completed with a voice recognition program. Efforts were made to edit the dictations, but occasionally words are mistranscribed.

## 2021-04-29 ENCOUNTER — IMMUNIZATION (OUTPATIENT)
Dept: VACCINE CLINIC | Facility: HOSPITAL | Age: 21
End: 2021-04-29

## 2021-04-29 PROCEDURE — 0002A: CPT | Performed by: INTERNAL MEDICINE

## 2021-04-29 PROCEDURE — 91300 HC SARSCOV02 VAC 30MCG/0.3ML IM: CPT | Performed by: INTERNAL MEDICINE

## 2021-07-16 DIAGNOSIS — R10.13 EPIGASTRIC PAIN: Chronic | ICD-10-CM

## 2021-07-16 DIAGNOSIS — R11.0 NAUSEA: Chronic | ICD-10-CM

## 2021-07-19 RX ORDER — OMEPRAZOLE 40 MG/1
CAPSULE, DELAYED RELEASE ORAL
Qty: 30 CAPSULE | Refills: 0 | Status: SHIPPED | OUTPATIENT
Start: 2021-07-19 | End: 2021-08-16

## 2021-08-16 DIAGNOSIS — R10.13 EPIGASTRIC PAIN: Chronic | ICD-10-CM

## 2021-08-16 DIAGNOSIS — R11.0 NAUSEA: Chronic | ICD-10-CM

## 2021-08-16 RX ORDER — OMEPRAZOLE 40 MG/1
CAPSULE, DELAYED RELEASE ORAL
Qty: 30 CAPSULE | Refills: 2 | Status: SHIPPED | OUTPATIENT
Start: 2021-08-16 | End: 2021-10-26

## 2021-08-16 RX ORDER — OMEPRAZOLE 40 MG/1
CAPSULE, DELAYED RELEASE ORAL
Qty: 30 CAPSULE | Refills: 2 | OUTPATIENT
Start: 2021-08-16

## 2021-08-28 ENCOUNTER — APPOINTMENT (OUTPATIENT)
Dept: CT IMAGING | Facility: HOSPITAL | Age: 21
End: 2021-08-28

## 2021-08-28 ENCOUNTER — HOSPITAL ENCOUNTER (EMERGENCY)
Facility: HOSPITAL | Age: 21
Discharge: HOME | End: 2021-08-29
Attending: STUDENT IN AN ORGANIZED HEALTH CARE EDUCATION/TRAINING PROGRAM | Admitting: STUDENT IN AN ORGANIZED HEALTH CARE EDUCATION/TRAINING PROGRAM

## 2021-08-28 DIAGNOSIS — R10.9 FLANK PAIN, ACUTE: Primary | ICD-10-CM

## 2021-08-28 DIAGNOSIS — D72.829 LEUKOCYTOSIS, UNSPECIFIED TYPE: ICD-10-CM

## 2021-08-28 LAB
ALBUMIN SERPL-MCNC: 4.3 G/DL (ref 3.5–5.2)
ALBUMIN/GLOB SERPL: 1.3 G/DL
ALP SERPL-CCNC: 62 U/L (ref 39–117)
ALT SERPL W P-5'-P-CCNC: 30 U/L (ref 1–33)
ANION GAP SERPL CALCULATED.3IONS-SCNC: 12 MMOL/L (ref 5–15)
AST SERPL-CCNC: 21 U/L (ref 1–32)
B-HCG UR QL: NEGATIVE
BASOPHILS # BLD AUTO: 0.05 10*3/MM3 (ref 0–0.2)
BASOPHILS NFR BLD AUTO: 0.4 % (ref 0–1.5)
BILIRUB SERPL-MCNC: <0.2 MG/DL (ref 0–1.2)
BILIRUB UR QL STRIP: NEGATIVE
BUN SERPL-MCNC: 9 MG/DL (ref 6–20)
BUN/CREAT SERPL: 12.3 (ref 7–25)
CALCIUM SPEC-SCNC: 9.5 MG/DL (ref 8.6–10.5)
CHLORIDE SERPL-SCNC: 102 MMOL/L (ref 98–107)
CLARITY UR: CLEAR
CO2 SERPL-SCNC: 23 MMOL/L (ref 22–29)
COLOR UR: YELLOW
CREAT SERPL-MCNC: 0.73 MG/DL (ref 0.57–1)
DEPRECATED RDW RBC AUTO: 38.5 FL (ref 37–54)
EOSINOPHIL # BLD AUTO: 0.28 10*3/MM3 (ref 0–0.4)
EOSINOPHIL NFR BLD AUTO: 2.3 % (ref 0.3–6.2)
ERYTHROCYTE [DISTWIDTH] IN BLOOD BY AUTOMATED COUNT: 13.3 % (ref 12.3–15.4)
GFR SERPL CREATININE-BSD FRML MDRD: 102 ML/MIN/1.73
GLOBULIN UR ELPH-MCNC: 3.2 GM/DL
GLUCOSE SERPL-MCNC: 87 MG/DL (ref 65–99)
GLUCOSE UR STRIP-MCNC: NEGATIVE MG/DL
HCT VFR BLD AUTO: 37.9 % (ref 34–46.6)
HGB BLD-MCNC: 12.4 G/DL (ref 12–15.9)
HGB UR QL STRIP.AUTO: NEGATIVE
HOLD SPECIMEN: NORMAL
IMM GRANULOCYTES # BLD AUTO: 0.03 10*3/MM3 (ref 0–0.05)
IMM GRANULOCYTES NFR BLD AUTO: 0.2 % (ref 0–0.5)
INTERNAL NEGATIVE CONTROL: NEGATIVE
INTERNAL POSITIVE CONTROL: POSITIVE
KETONES UR QL STRIP: ABNORMAL
LEUKOCYTE ESTERASE UR QL STRIP.AUTO: NEGATIVE
LIPASE SERPL-CCNC: 63 U/L (ref 13–60)
LYMPHOCYTES # BLD AUTO: 3.94 10*3/MM3 (ref 0.7–3.1)
LYMPHOCYTES NFR BLD AUTO: 31.9 % (ref 19.6–45.3)
Lab: NORMAL
MCH RBC QN AUTO: 26.3 PG (ref 26.6–33)
MCHC RBC AUTO-ENTMCNC: 32.7 G/DL (ref 31.5–35.7)
MCV RBC AUTO: 80.5 FL (ref 79–97)
MONOCYTES # BLD AUTO: 0.92 10*3/MM3 (ref 0.1–0.9)
MONOCYTES NFR BLD AUTO: 7.4 % (ref 5–12)
NEUTROPHILS NFR BLD AUTO: 57.8 % (ref 42.7–76)
NEUTROPHILS NFR BLD AUTO: 7.14 10*3/MM3 (ref 1.7–7)
NITRITE UR QL STRIP: NEGATIVE
NRBC BLD AUTO-RTO: 0 /100 WBC (ref 0–0.2)
PH UR STRIP.AUTO: 5.5 [PH] (ref 5–8)
PLATELET # BLD AUTO: 386 10*3/MM3 (ref 140–450)
PMV BLD AUTO: 9 FL (ref 6–12)
POTASSIUM SERPL-SCNC: 3.5 MMOL/L (ref 3.5–5.2)
PROT SERPL-MCNC: 7.5 G/DL (ref 6–8.5)
PROT UR QL STRIP: ABNORMAL
RBC # BLD AUTO: 4.71 10*6/MM3 (ref 3.77–5.28)
SODIUM SERPL-SCNC: 137 MMOL/L (ref 136–145)
SP GR UR STRIP: 1.04 (ref 1–1.03)
UROBILINOGEN UR QL STRIP: ABNORMAL
WBC # BLD AUTO: 12.36 10*3/MM3 (ref 3.4–10.8)
WHOLE BLOOD HOLD SPECIMEN: NORMAL
WHOLE BLOOD HOLD SPECIMEN: NORMAL

## 2021-08-28 PROCEDURE — 25010000002 KETOROLAC TROMETHAMINE PER 15 MG: Performed by: STUDENT IN AN ORGANIZED HEALTH CARE EDUCATION/TRAINING PROGRAM

## 2021-08-28 PROCEDURE — 99283 EMERGENCY DEPT VISIT LOW MDM: CPT

## 2021-08-28 PROCEDURE — 74177 CT ABD & PELVIS W/CONTRAST: CPT

## 2021-08-28 PROCEDURE — 25010000002 ONDANSETRON PER 1 MG: Performed by: STUDENT IN AN ORGANIZED HEALTH CARE EDUCATION/TRAINING PROGRAM

## 2021-08-28 PROCEDURE — 85025 COMPLETE CBC W/AUTO DIFF WBC: CPT

## 2021-08-28 PROCEDURE — 96374 THER/PROPH/DIAG INJ IV PUSH: CPT

## 2021-08-28 PROCEDURE — 81025 URINE PREGNANCY TEST: CPT | Performed by: STUDENT IN AN ORGANIZED HEALTH CARE EDUCATION/TRAINING PROGRAM

## 2021-08-28 PROCEDURE — 96361 HYDRATE IV INFUSION ADD-ON: CPT

## 2021-08-28 PROCEDURE — 96375 TX/PRO/DX INJ NEW DRUG ADDON: CPT

## 2021-08-28 PROCEDURE — 25010000002 IOPAMIDOL 61 % SOLUTION: Performed by: STUDENT IN AN ORGANIZED HEALTH CARE EDUCATION/TRAINING PROGRAM

## 2021-08-28 PROCEDURE — 83690 ASSAY OF LIPASE: CPT

## 2021-08-28 PROCEDURE — 81003 URINALYSIS AUTO W/O SCOPE: CPT | Performed by: STUDENT IN AN ORGANIZED HEALTH CARE EDUCATION/TRAINING PROGRAM

## 2021-08-28 PROCEDURE — 80053 COMPREHEN METABOLIC PANEL: CPT

## 2021-08-28 RX ORDER — SODIUM CHLORIDE 9 MG/ML
10 INJECTION INTRAVENOUS AS NEEDED
Status: DISCONTINUED | OUTPATIENT
Start: 2021-08-28 | End: 2021-08-29 | Stop reason: HOSPADM

## 2021-08-28 RX ORDER — KETOROLAC TROMETHAMINE 15 MG/ML
15 INJECTION, SOLUTION INTRAMUSCULAR; INTRAVENOUS ONCE
Status: COMPLETED | OUTPATIENT
Start: 2021-08-28 | End: 2021-08-28

## 2021-08-28 RX ORDER — ONDANSETRON 2 MG/ML
4 INJECTION INTRAMUSCULAR; INTRAVENOUS ONCE
Status: COMPLETED | OUTPATIENT
Start: 2021-08-28 | End: 2021-08-28

## 2021-08-28 RX ORDER — ONDANSETRON 4 MG/1
4 TABLET, ORALLY DISINTEGRATING ORAL 4 TIMES DAILY PRN
Qty: 15 TABLET | Refills: 0 | Status: SHIPPED | OUTPATIENT
Start: 2021-08-28 | End: 2021-10-26

## 2021-08-28 RX ADMIN — IOPAMIDOL 95 ML: 612 INJECTION, SOLUTION INTRAVENOUS at 21:49

## 2021-08-28 RX ADMIN — ONDANSETRON 4 MG: 2 INJECTION INTRAMUSCULAR; INTRAVENOUS at 21:29

## 2021-08-28 RX ADMIN — KETOROLAC TROMETHAMINE 15 MG: 15 INJECTION, SOLUTION INTRAMUSCULAR; INTRAVENOUS at 21:30

## 2021-08-28 RX ADMIN — SODIUM CHLORIDE, POTASSIUM CHLORIDE, SODIUM LACTATE AND CALCIUM CHLORIDE 1000 ML: 600; 310; 30; 20 INJECTION, SOLUTION INTRAVENOUS at 21:29

## 2021-08-29 VITALS
DIASTOLIC BLOOD PRESSURE: 64 MMHG | TEMPERATURE: 97.7 F | OXYGEN SATURATION: 99 % | RESPIRATION RATE: 16 BRPM | WEIGHT: 270 LBS | HEART RATE: 95 BPM | SYSTOLIC BLOOD PRESSURE: 116 MMHG | HEIGHT: 65 IN | BODY MASS INDEX: 44.98 KG/M2

## 2021-10-18 ENCOUNTER — TRANSCRIBE ORDERS (OUTPATIENT)
Dept: LAB | Facility: HOSPITAL | Age: 21
End: 2021-10-18

## 2021-10-18 ENCOUNTER — LAB (OUTPATIENT)
Dept: LAB | Facility: HOSPITAL | Age: 21
End: 2021-10-18

## 2021-10-18 DIAGNOSIS — Z20.822 COVID-19 RULED OUT: Primary | ICD-10-CM

## 2021-10-18 DIAGNOSIS — Z20.822 COVID-19 RULED OUT: ICD-10-CM

## 2021-10-18 PROCEDURE — C9803 HOPD COVID-19 SPEC COLLECT: HCPCS

## 2021-10-18 PROCEDURE — U0004 COV-19 TEST NON-CDC HGH THRU: HCPCS

## 2021-10-19 LAB — SARS-COV-2 RNA NOSE QL NAA+PROBE: NOT DETECTED

## 2021-10-26 ENCOUNTER — OFFICE VISIT (OUTPATIENT)
Dept: INTERNAL MEDICINE | Facility: CLINIC | Age: 21
End: 2021-10-26

## 2021-10-26 VITALS
SYSTOLIC BLOOD PRESSURE: 132 MMHG | BODY MASS INDEX: 48.82 KG/M2 | DIASTOLIC BLOOD PRESSURE: 80 MMHG | HEART RATE: 118 BPM | WEIGHT: 293 LBS | OXYGEN SATURATION: 100 % | HEIGHT: 65 IN | TEMPERATURE: 97.1 F

## 2021-10-26 DIAGNOSIS — R40.0 HAS DAYTIME DROWSINESS: ICD-10-CM

## 2021-10-26 DIAGNOSIS — R60.0 BILATERAL LOWER EXTREMITY EDEMA: ICD-10-CM

## 2021-10-26 DIAGNOSIS — R06.83 SNORES: ICD-10-CM

## 2021-10-26 DIAGNOSIS — Z76.89 ENCOUNTER TO ESTABLISH CARE: Primary | ICD-10-CM

## 2021-10-26 DIAGNOSIS — J30.89 ENVIRONMENTAL AND SEASONAL ALLERGIES: ICD-10-CM

## 2021-10-26 DIAGNOSIS — E66.01 MORBID OBESITY (HCC): ICD-10-CM

## 2021-10-26 DIAGNOSIS — G47.8 POOR SLEEP PATTERN: ICD-10-CM

## 2021-10-26 DIAGNOSIS — Z23 NEED FOR INFLUENZA VACCINATION: ICD-10-CM

## 2021-10-26 PROCEDURE — 90471 IMMUNIZATION ADMIN: CPT | Performed by: NURSE PRACTITIONER

## 2021-10-26 PROCEDURE — 3008F BODY MASS INDEX DOCD: CPT | Performed by: NURSE PRACTITIONER

## 2021-10-26 PROCEDURE — 2014F MENTAL STATUS ASSESS: CPT | Performed by: NURSE PRACTITIONER

## 2021-10-26 PROCEDURE — 99395 PREV VISIT EST AGE 18-39: CPT | Performed by: NURSE PRACTITIONER

## 2021-10-26 PROCEDURE — 90686 IIV4 VACC NO PRSV 0.5 ML IM: CPT | Performed by: NURSE PRACTITIONER

## 2021-11-08 ENCOUNTER — OFFICE VISIT (OUTPATIENT)
Dept: SLEEP MEDICINE | Facility: CLINIC | Age: 21
End: 2021-11-08

## 2021-11-08 VITALS
OXYGEN SATURATION: 99 % | DIASTOLIC BLOOD PRESSURE: 83 MMHG | WEIGHT: 293 LBS | BODY MASS INDEX: 48.82 KG/M2 | HEART RATE: 86 BPM | SYSTOLIC BLOOD PRESSURE: 153 MMHG | HEIGHT: 65 IN

## 2021-11-08 DIAGNOSIS — R06.83 SNORING: Primary | ICD-10-CM

## 2021-11-08 DIAGNOSIS — E66.01 MORBID (SEVERE) OBESITY DUE TO EXCESS CALORIES (HCC): ICD-10-CM

## 2021-11-08 DIAGNOSIS — F51.04 PSYCHOPHYSIOLOGICAL INSOMNIA: ICD-10-CM

## 2021-11-08 DIAGNOSIS — G47.33 OBSTRUCTIVE SLEEP APNEA, ADULT: ICD-10-CM

## 2021-11-08 PROCEDURE — 99203 OFFICE O/P NEW LOW 30 MIN: CPT | Performed by: INTERNAL MEDICINE

## 2021-11-09 ENCOUNTER — OFFICE VISIT (OUTPATIENT)
Dept: INTERNAL MEDICINE | Facility: CLINIC | Age: 21
End: 2021-11-09

## 2021-11-09 VITALS
HEART RATE: 82 BPM | OXYGEN SATURATION: 99 % | TEMPERATURE: 97.1 F | WEIGHT: 293 LBS | BODY MASS INDEX: 48.82 KG/M2 | DIASTOLIC BLOOD PRESSURE: 80 MMHG | HEIGHT: 65 IN | SYSTOLIC BLOOD PRESSURE: 124 MMHG

## 2021-11-09 DIAGNOSIS — Z48.02 VISIT FOR SUTURE REMOVAL: Primary | ICD-10-CM

## 2021-11-09 DIAGNOSIS — S61.210D LACERATION OF RIGHT INDEX FINGER WITHOUT FOREIGN BODY WITHOUT DAMAGE TO NAIL, SUBSEQUENT ENCOUNTER: ICD-10-CM

## 2021-11-09 PROCEDURE — 99213 OFFICE O/P EST LOW 20 MIN: CPT | Performed by: NURSE PRACTITIONER

## 2021-11-09 NOTE — PROGRESS NOTES
"     Office Visit      Patient Name: Nina Munguia  : 2000   MRN: [id number]     Chief Complaint:    Chief Complaint   Patient presents with   • Follow-up     hospital follow up, stitch removal       History of Present Illness: Nina Munguia is a 21 y.o. female who is here today for suture removal from right index finger, sutures placed in ED. No erythema, edema or drainage from site. Denies numbness, tingling, pain in right index finger or hand.    Subjective      I have reviewed and the following portions of the patient's history were updated as appropriate: past family history, past medical history, past social history, past surgical history and problem list.    No current outpatient medications on file.    Allergies   Allergen Reactions   • Metformin GI Intolerance   • Cat Hair Extract Unknown - Low Severity   • Dust Mite Extract Unknown - Low Severity       Objective     Physical Exam:  Vital Signs:   Vitals:    21 0830   BP: 124/80   Pulse: 82   Temp: 97.1 °F (36.2 °C)   SpO2: 99%   Weight: 135 kg (297 lb)   Height: 165.1 cm (65\")     Body mass index is 49.42 kg/m².    Physical Exam  Constitutional:       Appearance: She is not ill-appearing.   Eyes:      Conjunctiva/sclera: Conjunctivae normal.      Pupils: Pupils are equal, round, and reactive to light.   Cardiovascular:      Rate and Rhythm: Normal rate and regular rhythm.      Pulses:           Radial pulses are 2+ on the right side and 2+ on the left side.      Heart sounds: Normal heart sounds.   Pulmonary:      Effort: Pulmonary effort is normal.      Breath sounds: Normal breath sounds.   Musculoskeletal:      Right hand: Swelling present. No tenderness. Normal range of motion. Normal strength. Normal sensation. There is no disruption of two-point discrimination. Normal capillary refill. Normal pulse.      Cervical back: Normal range of motion and neck supple.   Skin:     General: Skin is warm.      Capillary Refill: Capillary " "refill takes less than 2 seconds.      Comments: Straight laceration across right PIP joint, well approximated, pink   Neurological:      Mental Status: She is alert and oriented to person, place, and time.      Coordination: Coordination normal.      Gait: Gait normal.   Psychiatric:         Mood and Affect: Mood normal.         Behavior: Behavior normal.         Thought Content: Thought content normal.         Suture Removal    Date/Time: 11/16/2021 8:20 PM  Performed by: Ana Harkins APRN  Authorized by: Ana Harkins APRN   Consent: Verbal consent obtained. Written consent obtained.  Risks and benefits: risks, benefits and alternatives were discussed  Consent given by: patient  Patient understanding: patient states understanding of the procedure being performed  Patient consent: the patient's understanding of the procedure matches consent given  Required items: required blood products, implants, devices, and special equipment available  Patient identity confirmed: verbally with patient  Time out: Immediately prior to procedure a \"time out\" was called to verify the correct patient, procedure, equipment, support staff and site/side marked as required.  Body area: upper extremity  Location details: right index finger  Wound Appearance: clean  Sutures Removed: 4  Post-removal: dressing applied  Patient tolerance: patient tolerated the procedure well with no immediate complications            Assessment / Plan      Assessment/Plan:   Diagnoses and all orders for this visit:    1. Visit for suture removal (Primary)  -     Suture Removal  2. Laceration of right index finger without foreign body without damage to nail, subsequent encounter  -     Monitor for signs and symptoms of infection, such as erythema, edema, bleeding, increased pain,, discharge/drainage.      Follow Up:   Return if symptoms worsen or fail to improve.    Patient was given instructions and counseling regarding her condition or " for health maintenance advice. Please see specific information pulled into the AVS if appropriate.       Primary Care [address] [facility]     Please note that portions of this note may have been completed with a voice recognition program. Efforts were made to edit dictation, but occasionally words are mistranscribed.

## 2021-11-13 NOTE — PROGRESS NOTES
Chief Complaint  Snoring, Daytime Sleepiness, Frequent Awakenings, Dry Mouth, Nasal Obstruction, Unable To Fall Asleep, and Unable To Stay Asleep    Subjective         Nina Munguia presents to [facility] for the evaluation of difficulties getting to sleep staying asleep.  Her primary care provider is OLIVER Moore.  She is seen in person in the sleep clinic.  History of Present Illness  Patient states she has been told she had snoring all of her life.  She had a sleep study many years ago as a child.  She says she is not rested on arising in the morning.  She awakens frequently at night.  She says others have told her that she has had apneas for about 4 years.  She awakens gasping for breath.  She is not rested on arising in the morning.  She has an occasional morning headache.    She will awaken with a dry mouth.  Denies ever breaking her nose.  She occasionally has trouble breathing through her nose due to nasal allergies.  She denies kicking or jerking her legs at night.  She denies having chronic pain that keeps her awake.  She is sometimes sleepy during the day but does not fall asleep if sitting quietly.  She denies problems driving.    She goes to bed about 11 PM.  She fall asleep in a couple of hours but says she is reading.  She awakens 3-4 times during the night.  She gets about 6 hours of sleep but is not rested.  She has had hypertension known for a year.  She denies any history of diabetes or coronary disease.  She then diagnosed with polycystic ovary syndrome.    Allergies: Dust and cat.    Habits: Smoking: Without    Ethanol: Without    Caffeine: She has 1 cup of coffee and 2-3 tony each day    Medical illnesses: Hypertension    Medications: Without    Surgeries: She had wisdom teeth extraction and ear tubes    Family history is positive for heart disease obesity COPD asthma and cancer and restless leg syndrome.  Both of her parents have obstructive  "sleep apnea.    Review of systems: Positives include fatigue, ear pain, rhinorrhea, sinus pressure, sneezing, sore throat, eye itching, eye redness, headache and lightheadedness.  Other systems were reviewed and negative.    [place] score is 9/24  Objective   Vital Signs:   /83 (BP Location: Left arm, Patient Position: Sitting, Cuff Size: Adult)   Pulse 86   Ht 165.1 cm (65\")   Wt (!) 138 kg (303 lb 12.8 oz)   SpO2 99%   BMI 50.55 kg/m²     Physical Exam patient to be awake and alert.  She does not appear to be in acute respiratory distress.    She is normocephalic.  She has Mallampati class II anatomy.    Lungs are clear.    Cardiac exam revealed normal S1 and S2.    Extremities showed trace pedal edema.  Result Review :           Assessment and Plan   Diagnoses and all orders for this visit:    1. Snoring (Primary)  -     Polysomnography 4 or More Parameters; Future  -     COVID PRE-OP / PRE-PROCEDURE SCREENING ORDER (NO ISOLATION) - Swab, Nasopharynx; Future    2. Obstructive sleep apnea, adult  -     Polysomnography 4 or More Parameters; Future  -     COVID PRE-OP / PRE-PROCEDURE SCREENING ORDER (NO ISOLATION) - Swab, Nasopharynx; Future    3. Psychophysiological insomnia    4. Morbid (severe) obesity due to excess calories (HCC)    Patient has a history of snoring and nonrestorative sleep.  She gives an excellent story for obstructive sleep apnea.  We will plan to proceed to polysomnogram.  We have discussed potential therapies including CPAP, weight control, oral appliance, and surgery.  We have also discussed the long-term consequences of untreated obstructive sleep apnea.  These include hypertension, diabetes, heart disease, stroke, and dementia.  She is encouraged to lose weight.  She has already had discussions with the dietitian.  She is encouraged to avoid alcohol and sedatives close to bedtime.  She is encouraged to practice lateral position sleep.  We will see her back after her " study.    Patient also seems to have some psychophysiologic insomnia.  We have discussed measures to improve sleep hygiene.  She is to try to get a regular rise time at a regular time.  She is to get light exposure early in the day.  She is encouraged to get exercise on a regular basis.  She is to cut off caffeine after noon.  She is develop a bedtime can get off of her electronic screens by at least an hour but at times she wishes to go to sleep.  These issues will need to be addressed further on her return.  I spent 35 minutes caring for Nina on this date of service. This time includes time spent by me in the following activities:obtaining and/or reviewing a separately obtained history, performing a medically appropriate examination and/or evaluation , counseling and educating the patient/family/caregiver, ordering medications, tests, or procedures and documenting information in the medical record  Follow Up   Return in about 2 weeks (around 11/22/2021) for Follow up after study, Next scheduled follow-up.  Patient was given instructions and counseling regarding her condition or for health maintenance advice. Please see specific information pulled into the AVS if appropriate.   Rashard Olivarez MD [facility]  Sleep Medicine  Pulmonary and Critical Care Medicine

## 2021-11-29 ENCOUNTER — LAB (OUTPATIENT)
Dept: LAB | Facility: HOSPITAL | Age: 21
End: 2021-11-29

## 2021-11-29 DIAGNOSIS — G47.33 OBSTRUCTIVE SLEEP APNEA, ADULT: ICD-10-CM

## 2021-11-29 DIAGNOSIS — R06.83 SNORING: ICD-10-CM

## 2021-11-29 LAB — SARS-COV-2 RNA PNL SPEC NAA+PROBE: NOT DETECTED

## 2021-11-29 PROCEDURE — C9803 HOPD COVID-19 SPEC COLLECT: HCPCS

## 2021-11-29 PROCEDURE — U0004 COV-19 TEST NON-CDC HGH THRU: HCPCS

## 2021-11-30 ENCOUNTER — APPOINTMENT (OUTPATIENT)
Dept: SLEEP MEDICINE | Facility: HOSPITAL | Age: 21
End: 2021-11-30

## 2021-12-02 ENCOUNTER — PATIENT MESSAGE (OUTPATIENT)
Dept: INTERNAL MEDICINE | Facility: CLINIC | Age: 21
End: 2021-12-02

## 2021-12-02 DIAGNOSIS — L73.2 HYDRADENITIS: Primary | ICD-10-CM

## 2021-12-03 RX ORDER — CLINDAMYCIN PHOSPHATE 10 MG/G
1 GEL TOPICAL 2 TIMES DAILY
Qty: 60 G | Refills: 0 | Status: SHIPPED | OUTPATIENT
Start: 2021-12-03

## 2021-12-03 NOTE — TELEPHONE ENCOUNTER
From: Nina Munguia  To: OLIVER Lowe  Sent: 12/2/2021 12:50 PM EST  Subject: Clindamycin    Hello, I have had boils and cysts on my armpits and my groin area for years now. I forgot to even mention it at our appointment a while back. My aunt has hidradenitis suppurativa and we believe that it could be that same thing. She has used the Clindamycin shower gel and cream and it has helped her tremendously. I was wondering if you could possibly call that in for me or what your professional advice would be? Please get back to me when you can.    -Nina Munguia.

## 2021-12-14 ENCOUNTER — HOSPITAL ENCOUNTER (OUTPATIENT)
Dept: SLEEP MEDICINE | Facility: HOSPITAL | Age: 21
End: 2021-12-14

## 2021-12-14 ENCOUNTER — TELEPHONE (OUTPATIENT)
Dept: SLEEP MEDICINE | Facility: HOSPITAL | Age: 21
End: 2021-12-14

## 2021-12-14 DIAGNOSIS — R06.83 SNORING: Primary | ICD-10-CM

## 2021-12-14 DIAGNOSIS — G47.33 OBSTRUCTIVE SLEEP APNEA, ADULT: ICD-10-CM

## 2021-12-14 NOTE — TELEPHONE ENCOUNTER
PATIENT CALLED REQUESTING A HOME SLEEP STUDY INSTEAD OF THE HOSPITAL OUTPATIENT STUDY DUE TO HER WORK SCHEDULE. PATIENT STATED VERY HARD TO SCHEDULE AND COMPLETE Polysomnography 4 or More Parameters    AND IS WANTING TO CHANGE THIS TO A HOME SLEEP STUDY. PLEASE ADVISE IF THIS IS OKAY AND IF SO NEW ORDER IS NEEDED FOR CENTRAL SCHEDULING.

## 2021-12-14 NOTE — PROGRESS NOTES
Patient prefers doing a home sleep test rather than polysomnogram.  We will place that order  Rashard Olivarez MD [facility]  Sleep Medicine  Pulmonary and Critical Care Medicine

## 2022-01-07 ENCOUNTER — TELEMEDICINE (OUTPATIENT)
Dept: INTERNAL MEDICINE | Facility: CLINIC | Age: 22
End: 2022-01-07

## 2022-01-07 DIAGNOSIS — K90.49 DAIRY PRODUCT INTOLERANCE: ICD-10-CM

## 2022-01-07 DIAGNOSIS — Z87.19 PERSONAL HISTORY OF GALLSTONES: Primary | ICD-10-CM

## 2022-01-07 PROCEDURE — 99213 OFFICE O/P EST LOW 20 MIN: CPT | Performed by: NURSE PRACTITIONER

## 2022-01-07 NOTE — PROGRESS NOTES
You have chosen to receive care through a telehealth visit.  Do you consent to use a video/audio connection for your medical care today? Yes  Active Parties: Ana BOYD, Roger Napoles and patient

## 2022-01-07 NOTE — PROGRESS NOTES
Mode of Visit: Video  Location of patient: home  You have chosen to receive care through a telehealth visit.  Does the patient consent to use a video/audio connection for your medical care today? Yes  The visit included audio and video interaction. No technical issues occurred during this visit.    Date: 2022  Name: Nina Munguia  : 2000         Chief Complaint:   Chief Complaint   Patient presents with   • Abdominal Pain     when eating dairy         HPI:  Nina Munguia is a 21 y.o. female presents for video visit in regard to abdominal pain, bloating when she eats dairy. Cheese causes the worst symptoms. Has only begun to notice this about a month ago.  She has been belching frequently, worse after eating.  Denies fever, chills, abdominal cramping, increased flatulence, change in bowel habits, blood in stool, fatigue, pelvic pressure. She has been told she has gallstones in the past.      History: The following portions of the patient's history were reviewed and updated as appropriate: allergies, current medications, past medical history, family history, surgical history, social history and problem list.        PE:  Physical Exam   Constitutional: She appears well-developed and well-nourished. No distress.   HENT:   Head: Normocephalic.   Right Ear: External ear normal.   Left Ear: External ear normal.   Eyes: Pupils are equal, round, and reactive to light. Conjunctivae are normal.   Neck: Neck normal appearance.  Pulmonary/Chest: Effort normal.   Abdominal: She exhibits no distension. There is no abdominal tenderness (palpated by patient at my direction).   Neurological: She is alert.   Oriented x 3   Skin: No pallor.   Psychiatric: She has a normal mood and affect. Her speech is normal and behavior is normal. Thought content normal. Her affect is normal.          Assessment/Plan:  Diagnoses and all orders for this visit:    1. Personal history of gallstones (Primary)  -     US  Gallbladder; Future    2. Dairy product intolerance  -     Milk Allergen Profile  - Keep food diary, try to identify all triggers.     Return if symptoms worsen or fail to improve.  Will go to the emergency room should significant or severe symptoms develop.  Patient was given instructions and counseling regarding her condition or for health maintenance advice. Please see specific information pulled into the AVS if appropriate.

## 2022-01-10 ENCOUNTER — IMMUNIZATION (OUTPATIENT)
Dept: INTERNAL MEDICINE | Facility: CLINIC | Age: 22
End: 2022-01-10

## 2022-01-10 DIAGNOSIS — Z23 NEED FOR COVID-19 VACCINE: Primary | ICD-10-CM

## 2022-01-10 PROCEDURE — 91300 COVID-19 (PFIZER): CPT | Performed by: NURSE PRACTITIONER

## 2022-01-10 PROCEDURE — 0004A COVID-19 (PFIZER): CPT | Performed by: NURSE PRACTITIONER

## 2022-01-31 ENCOUNTER — HOSPITAL ENCOUNTER (OUTPATIENT)
Dept: ULTRASOUND IMAGING | Facility: HOSPITAL | Age: 22
Discharge: HOME | End: 2022-01-31
Admitting: NURSE PRACTITIONER

## 2022-01-31 DIAGNOSIS — Z87.19 PERSONAL HISTORY OF GALLSTONES: ICD-10-CM

## 2022-01-31 PROCEDURE — 76705 ECHO EXAM OF ABDOMEN: CPT

## 2022-02-04 LAB
A-LACTALB IGE QN: <0.1 KU/L
B-LACTOGLOB IGE QN: <0.1 KU/L
CASEIN IGE QN: <0.1 KU/L
CHEDDAR IGE QN: <0.1 KU/L
CHEESE MOLD IGE QN: <0.1 KU/L
CONV CLASS DESCRIPTION: ABNORMAL
COW MILK IGE QN: 0.1 KU/L

## 2022-02-07 ENCOUNTER — PATIENT MESSAGE (OUTPATIENT)
Dept: INTERNAL MEDICINE | Facility: CLINIC | Age: 22
End: 2022-02-07

## 2022-02-07 DIAGNOSIS — K80.20 GALLSTONES: Primary | ICD-10-CM

## 2022-02-07 NOTE — TELEPHONE ENCOUNTER
Giovanna, Generic 2/7/2022 11:46 AM EST    I do not have a preference, I am fine with anyone. Thank you so much!

## 2022-02-11 NOTE — PROGRESS NOTES
Patient: Nina Munguia    YOB: 2000    Date: 02/14/2022    Primary Care Provider: Ana Harkins APRN    Chief Complaint   Patient presents with   • Cholelithiasis       SUBJECTIVE:    History of present illness:  I saw the patient in the office today as a consultation for evaluation and treatment of right upper quadrant pain which has been ongoing for 1 year now. Patient had gallbladder ultrasound which was done 01/31/2022 which showed gallstones.    Apparently she has had worsening episodes of right upper quadrant midepigastric abdominal discomfort, this is sharp in nature, worse after heavy meals, worse after fatty meals, can radiate into the right shoulder and back, can be associated with nausea, present over the past year or so, not eating makes it better.  She has noticed increased frequency of attacks and increased severity.    The following portions of the patient's history were reviewed and updated as appropriate: allergies, current medications, past family history, past medical history, past social history, past surgical history and problem list.    Review of Systems   Constitutional: Negative for chills, fever and unexpected weight change.   HENT: Negative for hearing loss, trouble swallowing and voice change.    Eyes: Negative for visual disturbance.   Respiratory: Negative for apnea, cough, chest tightness, shortness of breath and wheezing.    Cardiovascular: Negative for chest pain, palpitations and leg swelling.   Gastrointestinal: Positive for abdominal pain, diarrhea and nausea. Negative for abdominal distention, anal bleeding, blood in stool, constipation, rectal pain and vomiting.   Endocrine: Negative for cold intolerance and heat intolerance.   Genitourinary: Negative for difficulty urinating, dysuria and flank pain.   Musculoskeletal: Negative for back pain and gait problem.   Skin: Negative for color change, rash and wound.   Neurological: Negative for dizziness,  "syncope, speech difficulty, weakness, light-headedness, numbness and headaches.   Hematological: Negative for adenopathy. Does not bruise/bleed easily.   Psychiatric/Behavioral: Negative for confusion. The patient is not nervous/anxious.        History:  Past Medical History:   Diagnosis Date   • Allergic    • Asthma    • Hypertension    • PCOS (polycystic ovarian syndrome)    • Polycystic ovary syndrome        Past Surgical History:   Procedure Laterality Date   • EAR TUBES     • WISDOM TOOTH EXTRACTION         Family History   Problem Relation Age of Onset   • Breast cancer Mother    • Prostate cancer Maternal Grandfather    • Irritable bowel syndrome Brother    • Heart disease Other    • Obesity Other    • Sleep apnea Other    • COPD Other    • Asthma Other    • Cancer Other    • Colon cancer Neg Hx        Social History     Tobacco Use   • Smoking status: Never Smoker   • Smokeless tobacco: Never Used   Vaping Use   • Vaping Use: Former   Substance Use Topics   • Alcohol use: Never   • Drug use: Never       Allergies:  Allergies   Allergen Reactions   • Metformin GI Intolerance   • Cat Hair Extract Unknown - Low Severity   • Dust Mite Extract Unknown - Low Severity       Medications:    Current Outpatient Medications:   •  clindamycin (Clindagel) 1 % gel, Apply 1 application topically to the appropriate area as directed 2 (Two) Times a Day., Disp: 60 g, Rfl: 0    OBJECTIVE:    Vital Signs:   Vitals:    02/14/22 0945   BP: 118/62   Pulse: 92   Resp: 18   Temp: 97.3 °F (36.3 °C)   TempSrc: Temporal   SpO2: 99%   Weight: 133 kg (292 lb 3.2 oz)   Height: 165.1 cm (65\")       Physical Exam:   General Appearance:    Alert, cooperative, in no acute distress   Head:    Normocephalic, without obvious abnormality, atraumatic   Eyes:            Lids and lashes normal, conjunctivae and sclerae normal, no   icterus, no pallor, corneas clear, PERRLA   Ears:    Ears appear intact with no abnormalities noted   Throat:   No oral " lesions, no thrush, oral mucosa moist   Neck:   No adenopathy, supple, trachea midline, no thyromegaly, no   carotid bruit, no JVD   Lungs:     Clear to auscultation,respirations regular, even and                  unlabored    Heart:    Regular rhythm and normal rate, normal S1 and S2, no            murmur   Abdomen:     no masses, no organomegaly, soft non-tender, non-distended, no guarding, there is evidence of right upper quadrant tenderness, no peritoneal signs   Extremities:   Moves all extremities well, no edema, no cyanosis, no             redness   Pulses:   Pulses palpable and equal bilaterally   Skin:   No bleeding, bruising or rash   Lymph nodes:   No palpable adenopathy   Neurologic:   Cranial nerves 2 - 12 grossly intact, sensation intact      Results Review:   I reviewed the patient's new clinical results.  I reviewed the patient's new imaging results and agree with the interpretation.  I reviewed the patient's other test results and agree with the interpretation    Review of Systems was reviewed and confirmed as accurate as documented by the MA.    ASSESSMENT/PLAN:    1. Right upper quadrant abdominal pain    2. Calculus of gallbladder without cholecystitis without obstruction        I had a detailed and extensive discussion with the patient in the office and they understand that they need to undergo laparoscopic cholecystectomy with intraoperative cholangiography or possible open cholecystectomy. Full risks and benefits of operative versus nonoperative intervention were discussed with the patient and these included things such as nonresolution of symptoms and possible worsening of symptoms without surgical intervention versus infection, bleeding, open cholecystectomy, common bile duct injury, postoperative biliary leakage, need for drain placement, possible inability to perform cholangiography due to inflammation, postoperative abscess, etc with surgical intervention. The patient understands, agrees,  and wishes to proceed with the surgical treatment plan as mentioned above. The patient had no questions for me at the end of the discussion.  I did draw a picture of the anatomy for the patient and used this in my informed consent.     I discussed the patients findings and my recommendations with patient.    Electronically signed by Vernon Avila MD  02/15/22

## 2022-02-14 ENCOUNTER — OFFICE VISIT (OUTPATIENT)
Dept: SURGERY | Facility: CLINIC | Age: 22
End: 2022-02-14

## 2022-02-14 ENCOUNTER — PATIENT ROUNDING (BHMG ONLY) (OUTPATIENT)
Dept: SURGERY | Facility: CLINIC | Age: 22
End: 2022-02-14

## 2022-02-14 VITALS
SYSTOLIC BLOOD PRESSURE: 118 MMHG | HEART RATE: 92 BPM | OXYGEN SATURATION: 99 % | DIASTOLIC BLOOD PRESSURE: 62 MMHG | WEIGHT: 292.2 LBS | BODY MASS INDEX: 48.68 KG/M2 | RESPIRATION RATE: 18 BRPM | HEIGHT: 65 IN | TEMPERATURE: 97.3 F

## 2022-02-14 DIAGNOSIS — Z01.818 PRE-OP TESTING: Primary | ICD-10-CM

## 2022-02-14 DIAGNOSIS — K80.20 CALCULUS OF GALLBLADDER WITHOUT CHOLECYSTITIS WITHOUT OBSTRUCTION: ICD-10-CM

## 2022-02-14 DIAGNOSIS — R10.11 RIGHT UPPER QUADRANT ABDOMINAL PAIN: Primary | ICD-10-CM

## 2022-02-14 PROCEDURE — 99204 OFFICE O/P NEW MOD 45 MIN: CPT | Performed by: SURGERY

## 2022-02-14 NOTE — PROGRESS NOTES
February 14, 2022    Hello, may I speak with Nina Munguia?    My name is NO PEREZ    I am  with [facility]  [address]  [place] [address] [phone].    Before we get started may I verify your date of birth? 2000    I am calling to officially welcome you to our practice and ask about your recent visit. Is this a good time to talk? yes    Tell me about your visit with us. What things went well?  VISIT WAS VERY GOOD.       We're always looking for ways to make our patients' experiences even better. Do you have recommendations on ways we may improve?  no    Overall were you satisfied with your first visit to our practice? yes       I appreciate you taking the time to speak with me today. Is there anything else I can do for you? no      Thank you, and have a great day.

## 2022-02-15 RX ORDER — SODIUM CHLORIDE 9 MG/ML
100 INJECTION, SOLUTION INTRAVENOUS CONTINUOUS
Status: CANCELLED | OUTPATIENT
Start: 2022-02-15

## 2022-02-16 PROBLEM — R10.11 RIGHT UPPER QUADRANT ABDOMINAL PAIN: Status: ACTIVE | Noted: 2022-02-16

## 2022-02-18 ENCOUNTER — TELEPHONE (OUTPATIENT)
Dept: SURGERY | Facility: CLINIC | Age: 22
End: 2022-02-18

## 2022-02-21 ENCOUNTER — PRE-ADMISSION TESTING (OUTPATIENT)
Dept: PREADMISSION TESTING | Facility: HOSPITAL | Age: 22
End: 2022-02-21

## 2022-02-21 VITALS — BODY MASS INDEX: 48.82 KG/M2 | WEIGHT: 293 LBS | HEIGHT: 65 IN

## 2022-02-21 DIAGNOSIS — Z01.818 PRE-OP TESTING: ICD-10-CM

## 2022-02-21 LAB
ALBUMIN SERPL-MCNC: 4 G/DL (ref 3.5–5.2)
ALBUMIN/GLOB SERPL: 1.3 G/DL
ALP SERPL-CCNC: 59 U/L (ref 39–117)
ALT SERPL W P-5'-P-CCNC: 27 U/L (ref 1–33)
ANION GAP SERPL CALCULATED.3IONS-SCNC: 10.4 MMOL/L (ref 5–15)
AST SERPL-CCNC: 21 U/L (ref 1–32)
B-HCG UR QL: NEGATIVE
BILIRUB SERPL-MCNC: 0.3 MG/DL (ref 0–1.2)
BUN SERPL-MCNC: 14 MG/DL (ref 6–20)
BUN/CREAT SERPL: 18.7 (ref 7–25)
CALCIUM SPEC-SCNC: 9 MG/DL (ref 8.6–10.5)
CHLORIDE SERPL-SCNC: 106 MMOL/L (ref 98–107)
CO2 SERPL-SCNC: 21.6 MMOL/L (ref 22–29)
CREAT SERPL-MCNC: 0.75 MG/DL (ref 0.57–1)
DEPRECATED RDW RBC AUTO: 38.2 FL (ref 37–54)
ERYTHROCYTE [DISTWIDTH] IN BLOOD BY AUTOMATED COUNT: 13.2 % (ref 12.3–15.4)
GFR SERPL CREATININE-BSD FRML MDRD: 99 ML/MIN/1.73
GLOBULIN UR ELPH-MCNC: 3.1 GM/DL
GLUCOSE SERPL-MCNC: 96 MG/DL (ref 65–99)
HCT VFR BLD AUTO: 38.6 % (ref 34–46.6)
HGB BLD-MCNC: 12.7 G/DL (ref 12–15.9)
MAGNESIUM SERPL-MCNC: 1.8 MG/DL (ref 1.7–2.2)
MCH RBC QN AUTO: 26.6 PG (ref 26.6–33)
MCHC RBC AUTO-ENTMCNC: 32.9 G/DL (ref 31.5–35.7)
MCV RBC AUTO: 80.8 FL (ref 79–97)
PLATELET # BLD AUTO: 350 10*3/MM3 (ref 140–450)
PMV BLD AUTO: 9.2 FL (ref 6–12)
POTASSIUM SERPL-SCNC: 4.3 MMOL/L (ref 3.5–5.2)
PROT SERPL-MCNC: 7.1 G/DL (ref 6–8.5)
RBC # BLD AUTO: 4.78 10*6/MM3 (ref 3.77–5.28)
SARS-COV-2 RNA NOSE QL NAA+PROBE: NOT DETECTED
SODIUM SERPL-SCNC: 138 MMOL/L (ref 136–145)
TSH SERPL DL<=0.05 MIU/L-ACNC: 2.03 UIU/ML (ref 0.27–4.2)
WBC NRBC COR # BLD: 9.99 10*3/MM3 (ref 3.4–10.8)

## 2022-02-21 PROCEDURE — 83735 ASSAY OF MAGNESIUM: CPT | Performed by: NURSE PRACTITIONER

## 2022-02-21 PROCEDURE — 86376 MICROSOMAL ANTIBODY EACH: CPT | Performed by: NURSE PRACTITIONER

## 2022-02-21 PROCEDURE — U0004 COV-19 TEST NON-CDC HGH THRU: HCPCS

## 2022-02-21 PROCEDURE — 36415 COLL VENOUS BLD VENIPUNCTURE: CPT | Performed by: NURSE PRACTITIONER

## 2022-02-21 PROCEDURE — 80050 GENERAL HEALTH PANEL: CPT

## 2022-02-21 PROCEDURE — C9803 HOPD COVID-19 SPEC COLLECT: HCPCS

## 2022-02-21 PROCEDURE — 81025 URINE PREGNANCY TEST: CPT

## 2022-02-22 ENCOUNTER — ANESTHESIA EVENT (OUTPATIENT)
Dept: PERIOP | Facility: HOSPITAL | Age: 22
End: 2022-02-22

## 2022-02-22 LAB — THYROPEROXIDASE AB SERPL-ACNC: 10 IU/ML (ref 0–34)

## 2022-02-23 ENCOUNTER — APPOINTMENT (OUTPATIENT)
Dept: GENERAL RADIOLOGY | Facility: HOSPITAL | Age: 22
End: 2022-02-23

## 2022-02-23 ENCOUNTER — TELEPHONE (OUTPATIENT)
Dept: SURGERY | Facility: CLINIC | Age: 22
End: 2022-02-23

## 2022-02-23 ENCOUNTER — HOSPITAL ENCOUNTER (OUTPATIENT)
Facility: HOSPITAL | Age: 22
Setting detail: HOSPITAL OUTPATIENT SURGERY
Discharge: HOME | End: 2022-02-23
Attending: SURGERY | Admitting: SURGERY

## 2022-02-23 ENCOUNTER — HOSPITAL ENCOUNTER (OUTPATIENT)
Dept: SLEEP MEDICINE | Facility: HOSPITAL | Age: 22
End: 2022-02-23

## 2022-02-23 ENCOUNTER — ANESTHESIA (OUTPATIENT)
Dept: PERIOP | Facility: HOSPITAL | Age: 22
End: 2022-02-23

## 2022-02-23 VITALS
OXYGEN SATURATION: 99 % | HEART RATE: 73 BPM | TEMPERATURE: 97.5 F | SYSTOLIC BLOOD PRESSURE: 143 MMHG | DIASTOLIC BLOOD PRESSURE: 72 MMHG | RESPIRATION RATE: 18 BRPM

## 2022-02-23 DIAGNOSIS — G47.33 OBSTRUCTIVE SLEEP APNEA, ADULT: ICD-10-CM

## 2022-02-23 DIAGNOSIS — K80.20 CALCULUS OF GALLBLADDER WITHOUT CHOLECYSTITIS WITHOUT OBSTRUCTION: ICD-10-CM

## 2022-02-23 DIAGNOSIS — R10.11 RIGHT UPPER QUADRANT ABDOMINAL PAIN: ICD-10-CM

## 2022-02-23 DIAGNOSIS — K80.20 GALLSTONES: Primary | ICD-10-CM

## 2022-02-23 DIAGNOSIS — R06.83 SNORING: ICD-10-CM

## 2022-02-23 PROCEDURE — 25010000002 ONDANSETRON PER 1 MG: Performed by: NURSE ANESTHETIST, CERTIFIED REGISTERED

## 2022-02-23 PROCEDURE — 25010000002 IOPAMIDOL 61 % SOLUTION: Performed by: SURGERY

## 2022-02-23 PROCEDURE — 25010000002 KETOROLAC TROMETHAMINE PER 15 MG: Performed by: NURSE ANESTHETIST, CERTIFIED REGISTERED

## 2022-02-23 PROCEDURE — 95806 SLEEP STUDY UNATT&RESP EFFT: CPT | Performed by: INTERNAL MEDICINE

## 2022-02-23 PROCEDURE — 25010000002 PROPOFOL 200 MG/20ML EMULSION: Performed by: NURSE ANESTHETIST, CERTIFIED REGISTERED

## 2022-02-23 PROCEDURE — 94799 UNLISTED PULMONARY SVC/PX: CPT

## 2022-02-23 PROCEDURE — 25010000002 FENTANYL CITRATE (PF) 100 MCG/2ML SOLUTION: Performed by: NURSE ANESTHETIST, CERTIFIED REGISTERED

## 2022-02-23 PROCEDURE — 74300 X-RAY BILE DUCTS/PANCREAS: CPT

## 2022-02-23 PROCEDURE — C1889 IMPLANT/INSERT DEVICE, NOC: HCPCS | Performed by: SURGERY

## 2022-02-23 PROCEDURE — 25010000002 LORAZEPAM PER 2 MG: Performed by: NURSE ANESTHETIST, CERTIFIED REGISTERED

## 2022-02-23 PROCEDURE — 25010000002 SUCCINYLCHOLINE PER 20 MG: Performed by: NURSE ANESTHETIST, CERTIFIED REGISTERED

## 2022-02-23 PROCEDURE — 25010000002 DEXAMETHASONE PER 1 MG: Performed by: NURSE ANESTHETIST, CERTIFIED REGISTERED

## 2022-02-23 PROCEDURE — 0 AMPICILLIN-SULBACTAM PER 1.5 G

## 2022-02-23 PROCEDURE — 47563 LAPARO CHOLECYSTECTOMY/GRAPH: CPT | Performed by: SURGERY

## 2022-02-23 PROCEDURE — 25010000002 MIDAZOLAM PER 1MG: Performed by: NURSE ANESTHETIST, CERTIFIED REGISTERED

## 2022-02-23 PROCEDURE — 95806 SLEEP STUDY UNATT&RESP EFFT: CPT

## 2022-02-23 DEVICE — LIGACLIP 10-M/L, 10MM ENDOSCOPIC ROTATING MULTIPLE CLIP APPLIERS
Type: IMPLANTABLE DEVICE | Site: ABDOMEN | Status: FUNCTIONAL
Brand: LIGACLIP

## 2022-02-23 RX ORDER — BUPIVACAINE HYDROCHLORIDE 5 MG/ML
INJECTION, SOLUTION EPIDURAL; INTRACAUDAL AS NEEDED
Status: DISCONTINUED | OUTPATIENT
Start: 2022-02-23 | End: 2022-02-23 | Stop reason: HOSPADM

## 2022-02-23 RX ORDER — ONDANSETRON 4 MG/1
4 TABLET, FILM COATED ORAL ONCE AS NEEDED
Status: DISCONTINUED | OUTPATIENT
Start: 2022-02-23 | End: 2022-02-23 | Stop reason: HOSPADM

## 2022-02-23 RX ORDER — ROCURONIUM BROMIDE 10 MG/ML
INJECTION, SOLUTION INTRAVENOUS AS NEEDED
Status: DISCONTINUED | OUTPATIENT
Start: 2022-02-23 | End: 2022-02-23 | Stop reason: SURG

## 2022-02-23 RX ORDER — HYDROCODONE BITARTRATE AND ACETAMINOPHEN 7.5; 325 MG/1; MG/1
1 TABLET ORAL EVERY 6 HOURS PRN
Qty: 15 TABLET | Refills: 0 | Status: SHIPPED | OUTPATIENT
Start: 2022-02-23 | End: 2022-04-26

## 2022-02-23 RX ORDER — LIDOCAINE HYDROCHLORIDE 20 MG/ML
INJECTION, SOLUTION INTRAVENOUS AS NEEDED
Status: DISCONTINUED | OUTPATIENT
Start: 2022-02-23 | End: 2022-02-23 | Stop reason: SURG

## 2022-02-23 RX ORDER — ONDANSETRON 2 MG/ML
4 INJECTION INTRAMUSCULAR; INTRAVENOUS ONCE AS NEEDED
Status: COMPLETED | OUTPATIENT
Start: 2022-02-23 | End: 2022-02-23

## 2022-02-23 RX ORDER — KETOROLAC TROMETHAMINE 30 MG/ML
INJECTION, SOLUTION INTRAMUSCULAR; INTRAVENOUS AS NEEDED
Status: DISCONTINUED | OUTPATIENT
Start: 2022-02-23 | End: 2022-02-23 | Stop reason: SURG

## 2022-02-23 RX ORDER — SODIUM CHLORIDE 0.9 % (FLUSH) 0.9 %
10 SYRINGE (ML) INJECTION AS NEEDED
Status: DISCONTINUED | OUTPATIENT
Start: 2022-02-23 | End: 2022-02-23 | Stop reason: HOSPADM

## 2022-02-23 RX ORDER — MEPERIDINE HYDROCHLORIDE 25 MG/ML
12.5 INJECTION INTRAMUSCULAR; INTRAVENOUS; SUBCUTANEOUS
Status: DISCONTINUED | OUTPATIENT
Start: 2022-02-23 | End: 2022-02-23 | Stop reason: HOSPADM

## 2022-02-23 RX ORDER — PROPOFOL 10 MG/ML
INJECTION, EMULSION INTRAVENOUS AS NEEDED
Status: DISCONTINUED | OUTPATIENT
Start: 2022-02-23 | End: 2022-02-23 | Stop reason: SURG

## 2022-02-23 RX ORDER — SODIUM CHLORIDE, SODIUM LACTATE, POTASSIUM CHLORIDE, CALCIUM CHLORIDE 600; 310; 30; 20 MG/100ML; MG/100ML; MG/100ML; MG/100ML
1000 INJECTION, SOLUTION INTRAVENOUS CONTINUOUS
Status: DISCONTINUED | OUTPATIENT
Start: 2022-02-23 | End: 2022-02-23 | Stop reason: HOSPADM

## 2022-02-23 RX ORDER — LORAZEPAM 2 MG/ML
1 INJECTION INTRAMUSCULAR
Status: DISCONTINUED | OUTPATIENT
Start: 2022-02-23 | End: 2022-02-23 | Stop reason: HOSPADM

## 2022-02-23 RX ORDER — ONDANSETRON 2 MG/ML
INJECTION INTRAMUSCULAR; INTRAVENOUS AS NEEDED
Status: DISCONTINUED | OUTPATIENT
Start: 2022-02-23 | End: 2022-02-23 | Stop reason: SURG

## 2022-02-23 RX ORDER — MIDAZOLAM HYDROCHLORIDE 2 MG/2ML
INJECTION, SOLUTION INTRAMUSCULAR; INTRAVENOUS AS NEEDED
Status: DISCONTINUED | OUTPATIENT
Start: 2022-02-23 | End: 2022-02-23 | Stop reason: SURG

## 2022-02-23 RX ORDER — DEXAMETHASONE SODIUM PHOSPHATE 4 MG/ML
INJECTION, SOLUTION INTRA-ARTICULAR; INTRALESIONAL; INTRAMUSCULAR; INTRAVENOUS; SOFT TISSUE AS NEEDED
Status: DISCONTINUED | OUTPATIENT
Start: 2022-02-23 | End: 2022-02-23 | Stop reason: SURG

## 2022-02-23 RX ORDER — FENTANYL CITRATE 50 UG/ML
INJECTION, SOLUTION INTRAMUSCULAR; INTRAVENOUS AS NEEDED
Status: DISCONTINUED | OUTPATIENT
Start: 2022-02-23 | End: 2022-02-23 | Stop reason: SURG

## 2022-02-23 RX ORDER — SUCCINYLCHOLINE CHLORIDE 20 MG/ML
INJECTION INTRAMUSCULAR; INTRAVENOUS AS NEEDED
Status: DISCONTINUED | OUTPATIENT
Start: 2022-02-23 | End: 2022-02-23 | Stop reason: SURG

## 2022-02-23 RX ORDER — IBUPROFEN 600 MG/1
600 TABLET ORAL EVERY 6 HOURS PRN
Status: DISCONTINUED | OUTPATIENT
Start: 2022-02-23 | End: 2022-02-23 | Stop reason: HOSPADM

## 2022-02-23 RX ORDER — SODIUM CHLORIDE 9 MG/ML
100 INJECTION, SOLUTION INTRAVENOUS CONTINUOUS
Status: DISCONTINUED | OUTPATIENT
Start: 2022-02-23 | End: 2022-02-23 | Stop reason: HOSPADM

## 2022-02-23 RX ORDER — MAGNESIUM HYDROXIDE 1200 MG/15ML
LIQUID ORAL AS NEEDED
Status: DISCONTINUED | OUTPATIENT
Start: 2022-02-23 | End: 2022-02-23 | Stop reason: HOSPADM

## 2022-02-23 RX ORDER — ONDANSETRON 2 MG/ML
4 INJECTION INTRAMUSCULAR; INTRAVENOUS ONCE AS NEEDED
Status: DISCONTINUED | OUTPATIENT
Start: 2022-02-23 | End: 2022-02-23 | Stop reason: HOSPADM

## 2022-02-23 RX ADMIN — KETOROLAC TROMETHAMINE 30 MG: 30 INJECTION, SOLUTION INTRAMUSCULAR at 08:44

## 2022-02-23 RX ADMIN — ONDANSETRON 4 MG: 2 INJECTION INTRAMUSCULAR; INTRAVENOUS at 09:43

## 2022-02-23 RX ADMIN — FENTANYL CITRATE 100 MCG: 50 INJECTION INTRAMUSCULAR; INTRAVENOUS at 09:06

## 2022-02-23 RX ADMIN — ROCURONIUM BROMIDE 10 MG: 10 INJECTION INTRAVENOUS at 08:44

## 2022-02-23 RX ADMIN — SUCCINYLCHOLINE CHLORIDE 200 MG: 20 INJECTION, SOLUTION INTRAMUSCULAR; INTRAVENOUS at 08:44

## 2022-02-23 RX ADMIN — PROPOFOL 200 MG: 10 INJECTION, EMULSION INTRAVENOUS at 08:44

## 2022-02-23 RX ADMIN — ROCURONIUM BROMIDE 40 MG: 10 INJECTION INTRAVENOUS at 09:05

## 2022-02-23 RX ADMIN — SODIUM CHLORIDE, POTASSIUM CHLORIDE, SODIUM LACTATE AND CALCIUM CHLORIDE 1000 ML: 600; 310; 30; 20 INJECTION, SOLUTION INTRAVENOUS at 08:19

## 2022-02-23 RX ADMIN — MIDAZOLAM HYDROCHLORIDE 2 MG: 1 INJECTION, SOLUTION INTRAMUSCULAR; INTRAVENOUS at 08:44

## 2022-02-23 RX ADMIN — LIDOCAINE HYDROCHLORIDE 60 MG: 20 INJECTION, SOLUTION INTRAVENOUS at 08:44

## 2022-02-23 RX ADMIN — DEXAMETHASONE SODIUM PHOSPHATE 4 MG: 4 INJECTION, SOLUTION INTRAMUSCULAR; INTRAVENOUS at 08:44

## 2022-02-23 RX ADMIN — ONDANSETRON 4 MG: 2 INJECTION INTRAMUSCULAR; INTRAVENOUS at 08:44

## 2022-02-23 RX ADMIN — Medication 3 G: at 08:44

## 2022-02-23 RX ADMIN — FENTANYL CITRATE 100 MCG: 50 INJECTION INTRAMUSCULAR; INTRAVENOUS at 08:44

## 2022-02-23 RX ADMIN — LORAZEPAM 1 MG: 2 INJECTION INTRAMUSCULAR; INTRAVENOUS at 09:39

## 2022-02-23 NOTE — ANESTHESIA PREPROCEDURE EVALUATION
Anesthesia Evaluation     Patient summary reviewed and Nursing notes reviewed   no history of anesthetic complications:  NPO Solid Status: > 8 hours  NPO Liquid Status: > 8 hours           Airway   Mallampati: I  TM distance: >3 FB  Neck ROM: full  no difficulty expected  Dental - normal exam     Pulmonary - normal exam   (+) asthma,  Cardiovascular - normal exam    (+) hypertension,       Neuro/Psych- negative ROS  GI/Hepatic/Renal/Endo    (+)   renal disease,     Musculoskeletal (-) negative ROS    Abdominal    Substance History - negative use     OB/GYN negative ob/gyn ROS         Other - negative ROS                       Anesthesia Plan    ASA 3     general     intravenous induction     Anesthetic plan, all risks, benefits, and alternatives have been provided, discussed and informed consent has been obtained with: patient.        CODE STATUS:

## 2022-02-23 NOTE — TELEPHONE ENCOUNTER
I talked with pt, she will take off one week from work.  Work excuse note will be generated in her chart.

## 2022-02-23 NOTE — TELEPHONE ENCOUNTER
Caller: Nina Munguia    Relationship to patient: Self    Best call back number: [phone]    Patient is needing: PATIENT HAD SURGERY THIS MORNING WITH DR. GILLESPIE AND WAS TOLD TO CALL THE OFFICE TO SEE WHAT TIME OF RESTRICTIONS SHE WOULD HAVE OR HOW LONG SHE SHOULD BE OUT OF WORK.    UNABLE TO WARM TRANSFER.

## 2022-02-23 NOTE — ANESTHESIA PROCEDURE NOTES
Airway  Urgency: elective    Date/Time: 2/23/2022 8:43 AM  Airway not difficult    General Information and Staff    Patient location during procedure: OR  CRNA: Nas Akhtar CRNA    Indications and Patient Condition  Indications for airway management: airway protection    Preoxygenated: yes  Mask difficulty assessment: 1 - vent by mask    Final Airway Details  Final airway type: endotracheal airway      Successful airway: ETT  Cuffed: yes   Successful intubation technique: direct laryngoscopy  Facilitating devices/methods: intubating stylet  Endotracheal tube insertion site: oral  Blade: Ventura  Blade size: 2  ETT size (mm): 7.0  Cormack-Lehane Classification: grade I - full view of glottis  Placement verified by: chest auscultation and capnometry   Cuff volume (mL): 8  Measured from: lips  ETT/EBT  to lips (cm): 22  Number of attempts at approach: 1  Assessment: lips, teeth, and gum same as pre-op and atraumatic intubation    Additional Comments  Airway placed without problems. Dentition and Lips as pre-induction. ETT cuff inflated to minimal occlusive pressure.

## 2022-02-23 NOTE — ANESTHESIA POSTPROCEDURE EVALUATION
Patient: Nina Munguia    Procedure Summary     Date: 02/23/22 Room / Location: [facility] OR  /  [facility] OR    Anesthesia Start: 0836 Anesthesia Stop: 0943    Procedure: CHOLECYSTECTOMY LAPAROSCOPIC INTRAOPERATIVE CHOLANGIOGRAPHY (N/A Abdomen) Diagnosis:       Right upper quadrant abdominal pain      Calculus of gallbladder without cholecystitis without obstruction      (Right upper quadrant abdominal pain [R10.11])      (Calculus of gallbladder without cholecystitis without obstruction [K80.20])    Surgeons: Vernon Avila MD Provider: Nas Akhtar CRNA    Anesthesia Type: general ASA Status: 3          Anesthesia Type: general    Vitals  Vitals Value Taken Time   /65 02/23/22 1001   Temp 98 °F (36.7 °C) 02/23/22 0936   Pulse 65 02/23/22 1004   Resp 16 02/23/22 1000   SpO2 99 % 02/23/22 1004   Vitals shown include unvalidated device data.        Post Anesthesia Care and Evaluation    Patient location during evaluation: PACU  Patient participation: complete - patient participated  Level of consciousness: awake and alert and awake  Pain score: 3  Pain management: adequate  Airway patency: patent  Anesthetic complications: No anesthetic complications  PONV Status: controlled  Cardiovascular status: acceptable, hemodynamically stable and stable  Respiratory status: acceptable and nasal cannula  Hydration status: acceptable

## 2022-02-28 LAB
LAB AP CASE REPORT: NORMAL
PATH REPORT.FINAL DX SPEC: NORMAL

## 2022-03-08 NOTE — PROGRESS NOTES
"Patient: Nina Munguia    YOB: 2000    Date: 03/09/2022    Primary Care Provider: Ana Harkins APRN    Chief Complaint   Patient presents with   • Post-op Follow-up     Lap cheri       History of present illness:  I saw the patient in the office today as a followup from their recent laparoscopic cholecystectomy. Pathology report showed chronic cholecystitis with cholelithiasis.  They state that they have done well and are having no complaints.    Vital Signs:   Vitals:    03/09/22 1527   BP: 124/68   Pulse: 99   Resp: 18   Temp: 97.1 °F (36.2 °C)   TempSrc: Temporal   SpO2: 99%   Weight: 133 kg (293 lb 3.4 oz)   Height: 165.1 cm (65\")       Physical Exam:   General Appearance:    Alert, cooperative, in no acute distress   Abdomen:     no masses, no organomegaly, soft non-tender, non-distended, no guarding, wounds are well healed     Assessment / Plan :    1. Postoperative visit        I did discuss the situation with the patient today in the office and they have done well from their recent laparoscopic cholecystectomy.  I have released the patient back to normal activity, they understand that they need to be careful about heavy lifting.  I need to see the patient back in the office only if they are having further problems, they know to call me if they are.    Electronically signed by Vernon Avila MD  03/09/22                  "

## 2022-03-09 ENCOUNTER — OFFICE VISIT (OUTPATIENT)
Dept: SURGERY | Facility: CLINIC | Age: 22
End: 2022-03-09

## 2022-03-09 VITALS
DIASTOLIC BLOOD PRESSURE: 68 MMHG | WEIGHT: 293 LBS | HEIGHT: 65 IN | BODY MASS INDEX: 48.82 KG/M2 | RESPIRATION RATE: 18 BRPM | OXYGEN SATURATION: 99 % | HEART RATE: 99 BPM | SYSTOLIC BLOOD PRESSURE: 124 MMHG | TEMPERATURE: 97.1 F

## 2022-03-09 DIAGNOSIS — Z48.89 POSTOPERATIVE VISIT: Primary | ICD-10-CM

## 2022-03-09 PROCEDURE — 99024 POSTOP FOLLOW-UP VISIT: CPT | Performed by: SURGERY

## 2022-03-25 DIAGNOSIS — E66.01 MORBID OBESITY: ICD-10-CM

## 2022-03-25 DIAGNOSIS — K90.49 DAIRY PRODUCT INTOLERANCE: Primary | ICD-10-CM

## 2022-04-05 ENCOUNTER — PATIENT MESSAGE (OUTPATIENT)
Dept: INTERNAL MEDICINE | Facility: CLINIC | Age: 22
End: 2022-04-05

## 2022-04-07 NOTE — TELEPHONE ENCOUNTER
Jane Varner MA 4/5/2022 10:17 AM EDT      ----- Message -----  From: Nina Munguia  Sent: 4/5/2022 10:03 AM EDT  To: Shilpa Cerna [facility]  Subject: Abdominal pain     Hello, I woke up this morning with my stomach muscles very tight and maldonado in a way that was extremely painful and caused me to almost throw up. I am having to miss class because of it today. It comes in waves almost. Is there any medication that I could possibly take? Or is this normal due to where I had gallbladder surgery?

## 2022-04-26 ENCOUNTER — OFFICE VISIT (OUTPATIENT)
Dept: INTERNAL MEDICINE | Facility: CLINIC | Age: 22
End: 2022-04-26

## 2022-04-26 VITALS
OXYGEN SATURATION: 99 % | HEIGHT: 65 IN | SYSTOLIC BLOOD PRESSURE: 128 MMHG | WEIGHT: 293 LBS | DIASTOLIC BLOOD PRESSURE: 84 MMHG | BODY MASS INDEX: 48.82 KG/M2 | HEART RATE: 98 BPM | TEMPERATURE: 97.1 F

## 2022-04-26 DIAGNOSIS — F51.04 PSYCHOPHYSIOLOGICAL INSOMNIA: Primary | ICD-10-CM

## 2022-04-26 PROCEDURE — 99213 OFFICE O/P EST LOW 20 MIN: CPT | Performed by: NURSE PRACTITIONER

## 2022-04-26 RX ORDER — TRAZODONE HYDROCHLORIDE 50 MG/1
50 TABLET ORAL NIGHTLY PRN
Qty: 30 TABLET | Refills: 1 | Status: SHIPPED | OUTPATIENT
Start: 2022-04-26 | End: 2022-06-23 | Stop reason: SDUPTHER

## 2022-04-26 RX ORDER — OFLOXACIN 3 MG/ML
SOLUTION AURICULAR (OTIC)
COMMUNITY
Start: 2022-04-16 | End: 2022-09-14

## 2022-04-26 NOTE — PROGRESS NOTES
Office Visit      Patient Name: Nina Munguia  : 2000   MRN: [id number]   Care Team: Patient Care Team:  Ana Harkins APRN as PCP - General (Family Medicine)    Chief Complaint  Insomnia (Hallucinations, unable to move body when waking up, started Saturday night she will wake up around 4-5 am unable to move, last night this did not happen )    Subjective     Subjective      Nina Munguia presents to [facility] for sleep problem.   Symptoms started about 4 nights ago.   Woke up 2 nights ago and felt as though she couldn't move and was seeing spiders crawling on the wall. Got up once she could move and looked everywhere for bugs but didn't see anything. This has happened to her once or twice in the past but never 2 nights in a row. The next night she couldn't fall asleep, body wouldn't let her fall asleep and would jerk awake. Feels like she is awake in her mind but not her body. She has never been evaluated for these problems in the past.   She has been more anxious lately due to finals coming up.   Since this episode she has felt exhausted and can't sleep.   Seeing Dr. Olivarez and home sleep study was OK, oxygen did drop but not low enough to warrant a CPAP.      Review of Systems   Constitutional: Negative for appetite change, fatigue and fever.   HENT: Negative for congestion, sore throat and trouble swallowing.    Eyes: Negative for blurred vision and visual disturbance.   Respiratory: Negative for cough, shortness of breath and wheezing.    Cardiovascular: Negative for chest pain, palpitations and leg swelling.   Gastrointestinal: Negative for abdominal pain, blood in stool, constipation, diarrhea and nausea.   Endocrine: Negative for polydipsia, polyphagia and polyuria.   Genitourinary: Negative for dysuria.   Musculoskeletal: Negative for arthralgias, back pain and myalgias.   Skin: Negative for rash.   Neurological: Negative for dizziness, weakness,  "light-headedness and headache.   Psychiatric/Behavioral: Positive for sleep disturbance and stress. Negative for suicidal ideas and depressed mood. The patient is nervous/anxious.        Objective     Objective   Vital Signs:   /84   Pulse 98   Temp 97.1 °F (36.2 °C) (Temporal)   Ht 165.1 cm (65\")   Wt 133 kg (293 lb 6.4 oz)   SpO2 99%   BMI 48.82 kg/m²     Physical Exam  Vitals and nursing note reviewed.   Constitutional:       General: She is not in acute distress.     Appearance: Normal appearance. She is obese. She is not ill-appearing or toxic-appearing.   Eyes:      Extraocular Movements: Extraocular movements intact.      Right eye: Normal extraocular motion and no nystagmus.      Left eye: Normal extraocular motion and no nystagmus.      Pupils: Pupils are equal, round, and reactive to light.   Neck:      Vascular: No carotid bruit.   Cardiovascular:      Rate and Rhythm: Normal rate and regular rhythm.      Heart sounds: Normal heart sounds. No murmur heard.  Pulmonary:      Effort: Pulmonary effort is normal. No respiratory distress.      Breath sounds: Normal breath sounds. No wheezing.   Abdominal:      General: Bowel sounds are normal. There is no distension.      Palpations: Abdomen is soft.      Tenderness: There is no abdominal tenderness.   Musculoskeletal:      Cervical back: Neck supple. No tenderness.   Skin:     General: Skin is warm and dry.      Findings: No rash.   Neurological:      General: No focal deficit present.      Mental Status: She is alert and oriented to person, place, and time.      GCS: GCS eye subscore is 4. GCS verbal subscore is 5. GCS motor subscore is 6.      Motor: No weakness.      Gait: Gait normal.   Psychiatric:         Mood and Affect: Mood is anxious.         Behavior: Behavior normal.       Assessment / Plan      Assessment/Plan   Problem List Items Addressed This Visit    None     Visit Diagnoses     Psychophysiological insomnia    -  Primary    " Relevant Medications    traZODone (DESYREL) 50 MG tablet    Concerns for underlying sleep paralysis/cataplexy. Will trial low dose trazodone to help with insomnia, may need to consider SNRI for better control of anxiety but appears to be managing well at this time. Recommend she follow-up with sleep medicine, decrease caffeine, and sleep hygiene encouraged. Discussed SE of trazodone with her today, ER with any suicidal thoughts. Follow-up in 6 weeks, sooner if not improving, may need brain imaging in future neuro exam is reassuring today.            Follow Up   Return in about 6 weeks (around 6/7/2022) for Next scheduled follow up.  Patient was given instructions and counseling regarding her condition or for health maintenance advice. Please see specific information pulled into the AVS if appropriate.     OLIVER Manjarrez  [facility] [place]

## 2022-05-24 RX ORDER — LORATADINE AND PSEUDOEPHEDRINE SULFATE 10; 240 MG/1; MG/1
1 TABLET, EXTENDED RELEASE ORAL DAILY
Qty: 30 TABLET | Refills: 0 | Status: SHIPPED | OUTPATIENT
Start: 2022-05-24 | End: 2022-10-03 | Stop reason: ALTCHOICE

## 2022-06-21 ENCOUNTER — PATIENT MESSAGE (OUTPATIENT)
Dept: INTERNAL MEDICINE | Facility: CLINIC | Age: 22
End: 2022-06-21

## 2022-06-21 DIAGNOSIS — E28.2 PCOS (POLYCYSTIC OVARIAN SYNDROME): Primary | ICD-10-CM

## 2022-06-21 NOTE — TELEPHONE ENCOUNTER
Giovanna, Generic 6/21/2022 12:50 PM EDT    I would indeed like to be referred, I just heard that she would only accept my insurance if I am referred to her.

## 2022-06-23 DIAGNOSIS — F51.04 PSYCHOPHYSIOLOGICAL INSOMNIA: ICD-10-CM

## 2022-06-23 NOTE — TELEPHONE ENCOUNTER
Rx Refill Note  Requested Prescriptions     Pending Prescriptions Disp Refills   • traZODone (DESYREL) 50 MG tablet 30 tablet 1     Sig: Take 1 tablet by mouth At Night As Needed for Sleep.      Last office visit with prescribing clinician: 11/9/2021      Next office visit with prescribing clinician: 8/19/2022            KIT WAITE MA  06/23/22, 09:35 EDT

## 2022-06-24 ENCOUNTER — PATIENT MESSAGE (OUTPATIENT)
Dept: INTERNAL MEDICINE | Facility: CLINIC | Age: 22
End: 2022-06-24

## 2022-06-24 RX ORDER — TRAZODONE HYDROCHLORIDE 50 MG/1
50 TABLET ORAL NIGHTLY PRN
Qty: 30 TABLET | Refills: 0 | Status: SHIPPED | OUTPATIENT
Start: 2022-06-24 | End: 2022-07-21

## 2022-06-24 NOTE — TELEPHONE ENCOUNTER
One month refill sent. According to note when medication was prescribed, patient advised she would need appointment in 4-6 weeks.

## 2022-07-13 ENCOUNTER — OFFICE VISIT (OUTPATIENT)
Dept: INTERNAL MEDICINE | Facility: CLINIC | Age: 22
End: 2022-07-13

## 2022-07-13 VITALS
SYSTOLIC BLOOD PRESSURE: 120 MMHG | HEART RATE: 89 BPM | RESPIRATION RATE: 16 BRPM | HEIGHT: 65 IN | BODY MASS INDEX: 47.98 KG/M2 | WEIGHT: 288 LBS | OXYGEN SATURATION: 99 % | TEMPERATURE: 97.3 F | DIASTOLIC BLOOD PRESSURE: 72 MMHG

## 2022-07-13 DIAGNOSIS — S76.302D LEFT HAMSTRING INJURY, SUBSEQUENT ENCOUNTER: Primary | ICD-10-CM

## 2022-07-13 PROCEDURE — 99213 OFFICE O/P EST LOW 20 MIN: CPT | Performed by: FAMILY MEDICINE

## 2022-07-13 RX ORDER — DICLOFENAC SODIUM 75 MG/1
75 TABLET, DELAYED RELEASE ORAL 2 TIMES DAILY PRN
Qty: 60 TABLET | Refills: 3 | OUTPATIENT
Start: 2022-07-13 | End: 2022-09-14

## 2022-07-13 RX ORDER — NORGESTIMATE AND ETHINYL ESTRADIOL 7DAYSX3 LO
1 KIT ORAL DAILY
COMMUNITY
End: 2022-08-01 | Stop reason: SDUPTHER

## 2022-07-13 NOTE — PROGRESS NOTES
"07/13/2022      Assessment & Plan    Problem List Items Addressed This Visit    None     Visit Diagnoses     Left hamstring injury, subsequent encounter    -  Primary    Relevant Medications    diclofenac (VOLTAREN) 75 MG EC tablet    Other Relevant Orders    Ambulatory Referral to Physical Therapy Evaluate and treat (Completed)        Protect thigh. Rest. Ice. Compression. Elevation. Knows a PT who can see her today for this. Order provided, she'll schedule. Diclofenac bid with food x 5 days. If thigh fails to respond to conservative measures will refer to orthopedics or sports medicine.      Return for As Needed.      Subjective      Nina Munguia is a 21 y.o. female here for:  Chief Complaint   Patient presents with   • Leg Pain     UC claimed she had a grade 2 tear on left hamstring             History per MA reviewed.    Sunday night injury. Felt a pull in thigh with injury. Works at a summer [employer].   Went to [facility] on Monday, given steroid and Toradol but hasn't helped.         The following portions of the patient's history were reviewed and updated as appropriate: allergies, current medications, past family history, past medical history, past social history, past surgical history and problem list.    Review of Systems   Constitutional: Negative for fever.   Musculoskeletal: Positive for gait problem and myalgias.         Objective   Visit Vitals  /72 (BP Location: Left arm, Patient Position: Sitting, Cuff Size: Adult)   Pulse 89   Temp 97.3 °F (36.3 °C) (Temporal)   Resp 16   Ht 165.1 cm (65\")   Wt 131 kg (288 lb)   SpO2 99%   BMI 47.93 kg/m²       Physical Exam  Vitals and nursing note reviewed.   Constitutional:       General: She is not in acute distress.     Appearance: Normal appearance. She is well-developed and well-groomed. She is not ill-appearing, toxic-appearing or diaphoretic.      Interventions: Face mask in place.   HENT:      Head: Normocephalic and atraumatic.      Right Ear: Hearing " normal.      Left Ear: Hearing normal.   Eyes:      General: Lids are normal. No scleral icterus.     Extraocular Movements: Extraocular movements intact.   Neck:      Trachea: Phonation normal.   Pulmonary:      Effort: Pulmonary effort is normal.   Musculoskeletal:      Cervical back: Neck supple.      Left hip: Decreased range of motion.      Left upper leg: Tenderness present.      Left knee: No deformity. Decreased range of motion. No tenderness.      Comments: Neurovascularly intact   Skin:     Coloration: Skin is not jaundiced or pale.   Neurological:      General: No focal deficit present.      Mental Status: She is alert and oriented to person, place, and time.      Motor: Motor function is intact.      Gait: Gait abnormal (partial weight bearing left, walking with limp).   Psychiatric:         Attention and Perception: Attention and perception normal.         Mood and Affect: Mood and affect normal.         Speech: Speech normal.         Behavior: Behavior normal. Behavior is cooperative.         Thought Content: Thought content normal.         Cognition and Memory: Cognition and memory normal.         Judgment: Judgment normal.           For medical decision making review of the following was required:          Tonia Coppola MD

## 2022-07-21 DIAGNOSIS — F51.04 PSYCHOPHYSIOLOGICAL INSOMNIA: ICD-10-CM

## 2022-07-21 RX ORDER — TRAZODONE HYDROCHLORIDE 50 MG/1
50 TABLET ORAL NIGHTLY PRN
Qty: 30 TABLET | Refills: 0 | Status: SHIPPED | OUTPATIENT
Start: 2022-07-21 | End: 2022-08-24

## 2022-08-01 ENCOUNTER — OFFICE VISIT (OUTPATIENT)
Dept: OBSTETRICS AND GYNECOLOGY | Facility: CLINIC | Age: 22
End: 2022-08-01

## 2022-08-01 VITALS
WEIGHT: 282.6 LBS | BODY MASS INDEX: 47.09 KG/M2 | HEIGHT: 65 IN | SYSTOLIC BLOOD PRESSURE: 134 MMHG | DIASTOLIC BLOOD PRESSURE: 88 MMHG

## 2022-08-01 DIAGNOSIS — Z01.419 ENCOUNTER FOR ANNUAL ROUTINE GYNECOLOGICAL EXAMINATION: Primary | ICD-10-CM

## 2022-08-01 DIAGNOSIS — E28.2 PCOS (POLYCYSTIC OVARIAN SYNDROME): ICD-10-CM

## 2022-08-01 PROCEDURE — 2014F MENTAL STATUS ASSESS: CPT | Performed by: OBSTETRICS & GYNECOLOGY

## 2022-08-01 PROCEDURE — 99395 PREV VISIT EST AGE 18-39: CPT | Performed by: OBSTETRICS & GYNECOLOGY

## 2022-08-01 PROCEDURE — 3008F BODY MASS INDEX DOCD: CPT | Performed by: OBSTETRICS & GYNECOLOGY

## 2022-08-01 RX ORDER — NAPROXEN 500 MG/1
500 TABLET ORAL 2 TIMES DAILY
COMMUNITY
Start: 2022-06-21

## 2022-08-01 RX ORDER — NORGESTIMATE AND ETHINYL ESTRADIOL 7DAYSX3 LO
1 KIT ORAL DAILY
Qty: 90 TABLET | Refills: 3 | Status: SHIPPED | OUTPATIENT
Start: 2022-08-01 | End: 2022-10-30

## 2022-08-01 NOTE — PROGRESS NOTES
Gynecologic Annual Exam Note        Gynecologic Exam and Establish Care        Subjective     HPI  Nina Munguia is a 22 y.o.  female who presents for annual well woman exam as a new patient. Patient reports problems with: severe pelvic pain only during periods. Patient reports that she was diagnosed with PCOS when she was 14. Patient states she has had this pain in the past, but it has gotten worse since . Patient was last evaluated for this pain in 2019. Patient's last menstrual period was 2022 (exact date). Her periods are regular every 25-35 days, lasting 2-3 days. The flow is excessive, using 8-10 tampons or pads per day. Dysmenorrhea:severe, occurring throughout cycle. Partner Status: Marital Status: single.  She has never been sexually active. STD testing recommendations have been explained to the patient and she does desire STD testing.    Additional OB/GYN History   Current contraception: contraceptive methods: OCP (estrogen/progesterone) - continuous (Just started taking it in )   Desires to: continue contraception  Thromboembolic Disease: none  Age of menarche: 14    History of STD: no    Last Pap : Never   Last Completed Pap Smear     This patient has no relevant Health Maintenance data.           History of abnormal Pap smear: no  Gardasil status:completed  Family history of uterine, colon, breast, or ovarian cancer: yes - Mother - breast cancer (diagnosed age 47)   Performs monthly Self-Breast Exam: yes  Exercises Regularly:yes  Feelings of Anxiety or Depression: no  Tobacco Usage?: No       Current Outpatient Medications:   •  clindamycin (Clindagel) 1 % gel, Apply 1 application topically to the appropriate area as directed 2 (Two) Times a Day., Disp: 60 g, Rfl: 0  •  loratadine-pseudoephedrine (Claritin-D 24 Hour)  MG per 24 hr tablet, Take 1 tablet by mouth Daily., Disp: 30 tablet, Rfl: 0  •  naproxen (NAPROSYN) 500 MG tablet, Take 500 mg by mouth 2 (Two) Times  a Day., Disp: , Rfl:   •  norgestimate-ethinyl estradiol (ORTHO TRI-CYCLEN LO) 0.18/0.215/0.25 MG-25 MCG per tablet, Take 1 tablet by mouth Daily for 90 days., Disp: 90 tablet, Rfl: 3  •  ofloxacin (FLOXIN) 0.3 % otic solution, PLACE 5 FROPS IN RIGHT EAR TWICE DAILY, Disp: , Rfl:   •  traZODone (DESYREL) 50 MG tablet, TAKE 1 TABLET BY MOUTH AT NIGHT AS NEEDED FOR SLEEP, Disp: 30 tablet, Rfl: 0  •  diclofenac (VOLTAREN) 75 MG EC tablet, Take 1 tablet by mouth 2 (Two) Times a Day As Needed (Joint Pain)., Disp: 60 tablet, Rfl: 3     Patient is requesting refills of Ortho-Tri-Cyclen .    OB History        0    Para   0    Term   0       0    AB   0    Living   0       SAB   0    IAB   0    Ectopic   0    Molar   0    Multiple   0    Live Births   0                Health Maintenance   Topic Date Due   • Annual Gynecologic Pelvic and Breast Exam  Never done   • CHLAMYDIA SCREENING  Never done   • PAP SMEAR  Never done   • TDAP/TD VACCINES (2 - Td or Tdap) 05/15/2022   • INFLUENZA VACCINE  10/01/2022   • ANNUAL PHYSICAL  10/27/2022   • HEPATITIS C SCREENING  Completed   • COVID-19 Vaccine  Completed   • MENINGOCOCCAL VACCINE  Completed   • HPV VACCINES  Completed   • Pneumococcal Vaccine 0-64  Aged Out       Past Medical History:   Diagnosis Date   • Allergic     seasonal allergies   • Asthma    • Hypertension     Just in the emergency room.  She reports normal in PCP office.   • Kidney infection    • PCOS (polycystic ovarian syndrome)    • Piercing     nose piercing   • Tattoo    • UTI (urinary tract infection)         Past Surgical History:   Procedure Laterality Date   • CHOLECYSTECTOMY WITH INTRAOPERATIVE CHOLANGIOGRAM N/A 2022    Procedure: CHOLECYSTECTOMY LAPAROSCOPIC INTRAOPERATIVE CHOLANGIOGRAPHY;  Surgeon: Vernon Avila MD;  Location: [facility];  Service: General;  Laterality: N/A;   • EAR TUBES     • WISDOM TOOTH EXTRACTION         The additional following portions of the patient's history  "were reviewed and updated as appropriate: allergies, current medications, past family history, past medical history, past social history and past surgical history.    Review of Systems   Constitutional: Negative.    Respiratory: Negative.    Cardiovascular: Negative.    Gastrointestinal: Negative.    Genitourinary: Negative.    Neurological: Negative.    Psychiatric/Behavioral: Negative.          I have reviewed and agree with the HPI, ROS, and historical information as entered above. Renzo Perdomo MD        Objective   /88 (BP Location: Left arm, Patient Position: Sitting, Cuff Size: Adult)   Ht 165.1 cm (65\")   Wt 128 kg (282 lb 9.6 oz)   LMP 06/20/2022 (Exact Date)   Breastfeeding No   BMI 47.03 kg/m²     Physical Exam  Vitals reviewed. Exam conducted with a chaperone present.   Constitutional:       Appearance: Normal appearance.   HENT:      Head: Normocephalic and atraumatic.   Cardiovascular:      Rate and Rhythm: Normal rate and regular rhythm.   Pulmonary:      Effort: Pulmonary effort is normal.      Breath sounds: Normal breath sounds.   Chest:   Breasts:      Right: Normal.      Left: Normal.       Abdominal:      General: Abdomen is flat. Bowel sounds are normal.      Palpations: Abdomen is soft.   Genitourinary:     General: Normal vulva.      Vagina: Normal.      Cervix: Normal.      Uterus: Normal.       Adnexa: Right adnexa normal and left adnexa normal.   Musculoskeletal:      Cervical back: Neck supple.   Skin:     General: Skin is warm and dry.   Neurological:      Mental Status: She is alert and oriented to person, place, and time.   Psychiatric:         Mood and Affect: Mood normal.         Behavior: Behavior normal.            Assessment and Plan    Problem List Items Addressed This Visit        Endocrine and Metabolic    PCOS (polycystic ovarian syndrome)      Other Visit Diagnoses     Encounter for annual routine gynecological examination    -  Primary    Relevant Orders "    LIQUID-BASED PAP SMEAR, [facility] ([facility])          1. GYN annual well woman exam.   2. Reviewed pap guidelines.   3. Encouraged use of condoms for STD prevention.  4. OCP's/Vaginal Ring - Discussed side effects of nausea, BTB, headaches, breast tenderness and slight weight gain in the first three cycles.  Understands risks of blood clots, stroke, and theoretical risk of breast cancer.  Denies family history of blood clots.  5. Reviewed monthly self breast exams.  Instructed to call with lumps, pain, or breast discharge.    6. Reviewed exercise as a preventative health measures.   7. Reccommended Flu Vaccine in Fall of each year.  8. RTC in 1 year or PRN with problems  Return in about 1 year (around 8/1/2023) for Annual physical.      Renzo Perdomo MD  08/01/2022

## 2022-08-03 LAB — REF LAB TEST METHOD: NORMAL

## 2022-08-05 ENCOUNTER — PATIENT ROUNDING (BHMG ONLY) (OUTPATIENT)
Dept: OBSTETRICS AND GYNECOLOGY | Facility: CLINIC | Age: 22
End: 2022-08-05

## 2022-08-05 NOTE — PROGRESS NOTES
August 5, 2022    Hello, may I speak with Nina Munguia?    My name is Renea     I am  with [facility]  [facility]  [address]  [place] [address] [phone].    Before we get started may I verify your date of birth? 2000    I am calling to officially welcome you to our practice and ask about your recent visit. Is this a good time to talk? No - left voicemail     Tell me about your visit with us. What things went well?  na     We're always looking for ways to make our patients' experiences even better. Do you have recommendations on ways we may improve?  na    Overall were you satisfied with your first visit to our practice? na     I appreciate you taking the time to speak with me today. Is there anything else I can do for you? Na    Thank you, and have a great day.

## 2022-08-15 ENCOUNTER — APPOINTMENT (OUTPATIENT)
Dept: NUTRITION | Facility: HOSPITAL | Age: 22
End: 2022-08-15

## 2022-08-24 DIAGNOSIS — F51.04 PSYCHOPHYSIOLOGICAL INSOMNIA: ICD-10-CM

## 2022-08-24 RX ORDER — TRAZODONE HYDROCHLORIDE 50 MG/1
50 TABLET ORAL NIGHTLY PRN
Qty: 30 TABLET | Refills: 0 | Status: SHIPPED | OUTPATIENT
Start: 2022-08-24 | End: 2022-09-20

## 2022-08-24 NOTE — TELEPHONE ENCOUNTER
Rx Refill Note  Requested Prescriptions     Pending Prescriptions Disp Refills   • traZODone (DESYREL) 50 MG tablet [Pharmacy Med Name: TRAZODONE 50MG TABLETS] 30 tablet 0     Sig: TAKE 1 TABLET BY MOUTH AT NIGHT AS NEEDED FOR SLEEP      Last office visit with prescribing clinician: 11/9/2021      Next office visit with prescribing clinician: Visit date not found            KIT WAITE MA  08/24/22, 09:46 EDT

## 2022-09-14 PROCEDURE — U0004 COV-19 TEST NON-CDC HGH THRU: HCPCS | Performed by: FAMILY MEDICINE

## 2022-09-20 DIAGNOSIS — F51.04 PSYCHOPHYSIOLOGICAL INSOMNIA: ICD-10-CM

## 2022-09-20 RX ORDER — TRAZODONE HYDROCHLORIDE 50 MG/1
50 TABLET ORAL NIGHTLY PRN
Qty: 30 TABLET | Refills: 0 | Status: SHIPPED | OUTPATIENT
Start: 2022-09-20

## 2023-02-02 ENCOUNTER — PATIENT MESSAGE (OUTPATIENT)
Dept: INTERNAL MEDICINE | Facility: CLINIC | Age: 23
End: 2023-02-02
Payer: COMMERCIAL

## 2023-02-02 NOTE — TELEPHONE ENCOUNTER
From: Nina Munguia  To: Ana Harkins  Sent: 2/2/2023 10:48 AM EST  Subject: Ozempic    Hi there, I am starting a weight loss journey to just better myself. I have heard good experiences about ozempic. I was wondering if I qualify for that and that my insurance would approve me for it. Or if you have any other suggestions that would help? My schedule is so tight that I wouldn’t have time to see a nutritionist like I had asked about before. So I figured this could be a decent option if you also think so. Thank you!!

## 2023-04-21 ENCOUNTER — OFFICE VISIT (OUTPATIENT)
Dept: INTERNAL MEDICINE | Facility: CLINIC | Age: 23
End: 2023-04-21
Payer: COMMERCIAL

## 2023-04-21 VITALS
BODY MASS INDEX: 48.82 KG/M2 | WEIGHT: 293 LBS | HEART RATE: 100 BPM | OXYGEN SATURATION: 99 % | HEIGHT: 65 IN | SYSTOLIC BLOOD PRESSURE: 120 MMHG | TEMPERATURE: 98.9 F | DIASTOLIC BLOOD PRESSURE: 82 MMHG

## 2023-04-21 DIAGNOSIS — F51.04 PSYCHOPHYSIOLOGICAL INSOMNIA: ICD-10-CM

## 2023-04-21 DIAGNOSIS — Z88.9 MULTIPLE ALLERGIES: Primary | ICD-10-CM

## 2023-04-21 RX ORDER — HYDROXYZINE HYDROCHLORIDE 25 MG/1
25 TABLET, FILM COATED ORAL NIGHTLY PRN
Qty: 90 TABLET | Refills: 1 | Status: SHIPPED | OUTPATIENT
Start: 2023-04-21

## 2023-04-21 RX ORDER — TRAZODONE HYDROCHLORIDE 50 MG/1
50 TABLET ORAL NIGHTLY PRN
Qty: 90 TABLET | Refills: 3 | Status: SHIPPED | OUTPATIENT
Start: 2023-04-21

## 2023-04-21 RX ORDER — AZELASTINE 1 MG/ML
2 SPRAY, METERED NASAL 2 TIMES DAILY
Qty: 30 ML | Refills: 11 | Status: SHIPPED | OUTPATIENT
Start: 2023-04-21

## 2023-04-21 NOTE — PROGRESS NOTES
Office Visit      Patient Name: Nina Munguia  : 2000   MRN: [id number]     Chief Complaint:    Chief Complaint   Patient presents with   • Psychophysiological insomnia       History of Present Illness: Nina Munguia is a 22 y.o. female who is here today with request for refill of trazodone.  She has been taking it for the past year when needed for insomnia.  Works well.  Does leave her feeling a bit groggy the next day. Denies depressed mood, anhedonia, feelings of worthlessness, difficulty concentrating, impaired memory, SI, HI, panic attacks, unexpected weight change.    Seasonal and environmental allergies.  Has taken allergy shots in the past.  Allergy to pollen, mold, dust, cats.  Currently taking zyrtec daily, not working well.  Has used flonase nasal spray without relief.  Has taken Claritin, Allegra in the past.  They stopped working.  Took Singulair when she was young, woke up with eyes matted shut.  Stopped taking it and eye matting resolved.      Subjective      I have reviewed and the following portions of the patient's history were updated as appropriate: past family history, past medical history, past social history, past surgical history and problem list.      Current Outpatient Medications:   •  norgestimate-ethinyl estradiol (ORTHO TRI-CYCLEN LO) 0.18/0.215/0.25 MG-25 MCG per tablet, Take 1 tablet by mouth Daily for 90 days., Disp: 90 tablet, Rfl: 3  •  traZODone (DESYREL) 50 MG tablet, Take 1 tablet by mouth At Night As Needed for Sleep., Disp: 90 tablet, Rfl: 3  •  azelastine (ASTELIN) 0.1 % nasal spray, 2 sprays into the nostril(s) as directed by provider 2 (Two) Times a Day. Use in each nostril as directed, Disp: 30 mL, Rfl: 11  •  hydrOXYzine (ATARAX) 25 MG tablet, Take 1 tablet by mouth At Night As Needed for Itching or Anxiety., Disp: 90 tablet, Rfl: 1    Allergies   Allergen Reactions   • Banana Anaphylaxis   • Cat Hair Extract Unknown - Low Severity     Nose  "stuffy, throat scratchy, eye swelling   • Dust Mite Extract Unknown - Low Severity     Eye swelling, throat scratchy, stuffy nose.   • Metformin GI Intolerance   • Milk-Related Compounds GI Intolerance       Objective     Physical Exam:  Vital Signs:   Vitals:    04/21/23 0818   BP: 120/82   Pulse: 100   Temp: 98.9 °F (37.2 °C)   SpO2: 99%   Weight: 134 kg (295 lb)   Height: 165.1 cm (65\")     Body mass index is 49.09 kg/m².    Physical Exam  Constitutional:       Appearance: She is not ill-appearing.   HENT:      Head: Normocephalic.      Right Ear: Tympanic membrane, ear canal and external ear normal.      Left Ear: Tympanic membrane, ear canal and external ear normal.      Nose: Mucosal edema present.      Mouth/Throat:      Mouth: Mucous membranes are moist.      Comments: Posterior oropharyngeal cobblestoning  Eyes:      Conjunctiva/sclera: Conjunctivae normal.      Pupils: Pupils are equal, round, and reactive to light.   Cardiovascular:      Rate and Rhythm: Normal rate and regular rhythm.      Heart sounds: Normal heart sounds.   Pulmonary:      Effort: Pulmonary effort is normal.      Breath sounds: Normal breath sounds.   Musculoskeletal:      Cervical back: Normal range of motion and neck supple.      Right lower leg: No edema.      Left lower leg: No edema.   Skin:     General: Skin is warm.      Capillary Refill: Capillary refill takes less than 2 seconds.   Neurological:      Mental Status: She is alert and oriented to person, place, and time.      Coordination: Coordination normal.      Gait: Gait normal.   Psychiatric:         Mood and Affect: Mood normal.         Behavior: Behavior normal.         Thought Content: Thought content normal.       Assessment / Plan      Assessment/Plan:   Diagnoses and all orders for this visit:    1. Multiple allergies (Primary)  -     hydrOXYzine (ATARAX) 25 MG tablet; Take 1 tablet by mouth At Night As Needed for Itching or Anxiety.  Dispense: 90 tablet; Refill: 1  - "     Ambulatory Referral to Allergy  -     azelastine (ASTELIN) 0.1 % nasal spray; 2 sprays into the nostril(s) as directed by provider 2 (Two) Times a Day. Use in each nostril as directed  Dispense: 30 mL; Refill: 11  - Avoid known triggers when possible     2. Psychophysiological insomnia  -     traZODone (DESYREL) 50 MG tablet; Take 1 tablet by mouth At Night As Needed for Sleep.  Dispense: 90 tablet; Refill: 3  - May take hydroxyzine for insomnia.  Advised to take it by itself before bed one night this weekend to evaluate efficacy and any potential side effects.        - Encouraged to take part in daily physical exercise.          - Eat healthy, well balanced diet; avoid sugary foods or beverages        - Continue to abstain from alcohol and drugs        - Ensure good night's sleep by creating calm space in bedroom, avoiding screen time 1-2 hours before bed, no caffeine after 5 pm    Follow Up:   Return for Annual.    Patient was given instructions and counseling regarding her condition or for health maintenance advice. Please see specific information pulled into the AVS if appropriate.       [facility] [address] [facility]     Please note that portions of this note may have been completed with a voice recognition program. Efforts were made to edit dictation, but occasionally words are mistranscribed.

## 2023-04-26 ENCOUNTER — PATIENT MESSAGE (OUTPATIENT)
Dept: INTERNAL MEDICINE | Facility: CLINIC | Age: 23
End: 2023-04-26
Payer: COMMERCIAL

## 2023-04-26 NOTE — TELEPHONE ENCOUNTER
From: Nina Munguia  To: Ana Harkins  Sent: 4/26/2023 11:01 AM EDT  Subject: Hydroxyzine    Hi there. I have been put on hydroxyzine back last Friday. I have been taking it like instructed and it has been the exact same in terms of my allergies. Is there anything else that I could possibly take that will help? Thank you.

## 2023-05-20 ENCOUNTER — OFFICE VISIT (OUTPATIENT)
Dept: INTERNAL MEDICINE | Facility: CLINIC | Age: 23
End: 2023-05-20
Payer: COMMERCIAL

## 2023-05-20 VITALS
WEIGHT: 293 LBS | DIASTOLIC BLOOD PRESSURE: 84 MMHG | OXYGEN SATURATION: 99 % | BODY MASS INDEX: 48.82 KG/M2 | HEIGHT: 65 IN | HEART RATE: 89 BPM | SYSTOLIC BLOOD PRESSURE: 130 MMHG | TEMPERATURE: 97.7 F

## 2023-05-20 DIAGNOSIS — F51.04 PSYCHOPHYSIOLOGICAL INSOMNIA: ICD-10-CM

## 2023-05-20 DIAGNOSIS — E28.2 PCOS (POLYCYSTIC OVARIAN SYNDROME): ICD-10-CM

## 2023-05-20 DIAGNOSIS — R10.32 LLQ PAIN: ICD-10-CM

## 2023-05-20 DIAGNOSIS — Z00.00 ENCOUNTER FOR ANNUAL PHYSICAL EXAMINATION EXCLUDING GYNECOLOGICAL EXAMINATION IN A PATIENT OLDER THAN 17 YEARS: Primary | ICD-10-CM

## 2023-05-20 DIAGNOSIS — E66.01 CLASS 3 SEVERE OBESITY DUE TO EXCESS CALORIES WITHOUT SERIOUS COMORBIDITY WITH BODY MASS INDEX (BMI) OF 45.0 TO 49.9 IN ADULT: ICD-10-CM

## 2023-05-20 DIAGNOSIS — Z88.9 MULTIPLE ALLERGIES: ICD-10-CM

## 2023-05-20 DIAGNOSIS — R31.9 HEMATURIA, UNSPECIFIED TYPE: ICD-10-CM

## 2023-05-20 DIAGNOSIS — Q62.5 DUPLICATED LEFT RENAL COLLECTING SYSTEM: ICD-10-CM

## 2023-05-20 LAB
BILIRUB BLD-MCNC: NEGATIVE MG/DL
CLARITY, POC: ABNORMAL
COLOR UR: YELLOW
EXPIRATION DATE: ABNORMAL
GLUCOSE UR STRIP-MCNC: NEGATIVE MG/DL
KETONES UR QL: NEGATIVE
LEUKOCYTE EST, POC: NEGATIVE
Lab: ABNORMAL
NITRITE UR-MCNC: NEGATIVE MG/ML
PH UR: 6 [PH] (ref 5–8)
PROT UR STRIP-MCNC: NEGATIVE MG/DL
RBC # UR STRIP: ABNORMAL /UL
SP GR UR: 1.02 (ref 1–1.03)
UROBILINOGEN UR QL: NORMAL

## 2023-05-20 RX ORDER — TRAZODONE HYDROCHLORIDE 50 MG/1
TABLET ORAL
Qty: 180 TABLET | Refills: 3 | Status: SHIPPED | OUTPATIENT
Start: 2023-05-20

## 2023-05-20 RX ORDER — EPINEPHRINE 0.3 MG/.3ML
0.3 INJECTION SUBCUTANEOUS ONCE
Qty: 1 EACH | Refills: 0 | Status: SHIPPED | OUTPATIENT
Start: 2023-05-20 | End: 2023-05-20

## 2023-05-20 NOTE — PROGRESS NOTES
Chief Complaint   Patient presents with   • Annual Exam     Abdominal pain yesterday could have been GERD. She left work early. Today is fine.        Nina Munguia is a 22 y.o. female and is here for a comprehensive physical exam.     Insomnia:     Ate pineapple for the first time recently.  [place] her throat closing up, felt tight.  Is allergic to bananas, as well.  Has been referred to Allergist, appointment in July.      Abdominal pain yesterday.  Started in her left lower abdomen, radiated across to the right side. Felt like she was going to be sick.  Pain has resolved today.  Tested earlier this week for UTI, urinalysis was clear at [facility].  Not drinking more soda, milk.  Lactose intolerant, takes Lactaid.      Has been told she snores.  Saw Dr Olivarez, pulmonologist.  At home sleep study, advised to practice better sleep hygiene.       History:  LMP: . (Menstrual status: Oral contraceptives).  Menarche: 14 years old  Sexual activity:  Never   Last pap date: 22, follows gynecology  Abnormal pap? no  : 0  Para: 0    Do you take any herbs or supplements that were not prescribed by a doctor? Omega 3, Vitamin D    Health Habits:  Dental Exam. not up to date - plans to schedule an appointment w/new insurance   Eye Exam. up to date  Exercise: 0 times/week.  Current exercise activities include: hiking, lifting at work several days a week   Diet: well balanced, loves vegetables    Health Maintenance   Topic Date Due   • COVID-19 Vaccine (4 - Booster for Pfizer series) 2022   • TDAP/TD VACCINES (2 - Td or Tdap) 05/15/2022   • INFLUENZA VACCINE  2023   • ANNUAL PHYSICAL  2024   • PAP SMEAR  2025   • HEPATITIS C SCREENING  Completed   • MENINGOCOCCAL VACCINE  Completed   • HPV VACCINES  Completed   • Pneumococcal Vaccine 0-64  Aged Out       PMH, PSH, SocHx, FamHx, Allergies, and Medications: Reviewed and updated in the Visit Navigator.     Allergies   Allergen  Reactions   • Banana Anaphylaxis   • Cat Hair Extract Unknown - Low Severity     Nose stuffy, throat scratchy, eye swelling   • Dust Mite Extract Unknown - Low Severity     Eye swelling, throat scratchy, stuffy nose.   • Pineapple Swelling     Swelling in the throat     • Metformin GI Intolerance   • Milk-Related Compounds GI Intolerance     Past Medical History:   Diagnosis Date   • ADHD (attention deficit hyperactivity disorder)     I dont know exact date, i was in the 3rd grade   • Allergic     seasonal allergies   • Asthma    • Cholelithiasis 2021    Gallstones   • Hypertension     Just in the emergency room.  She reports normal in PCP office.   • Kidney infection    • Obesity    • PCOS (polycystic ovarian syndrome)    • Piercing     nose piercing   • Tattoo    • UTI (urinary tract infection)      Past Surgical History:   Procedure Laterality Date   • CHOLECYSTECTOMY  2/17/2022    I know it was in febuary of 2022   • CHOLECYSTECTOMY WITH INTRAOPERATIVE CHOLANGIOGRAM N/A 02/23/2022    Procedure: CHOLECYSTECTOMY LAPAROSCOPIC INTRAOPERATIVE CHOLANGIOGRAPHY;  Surgeon: Vernon Avila MD;  Location: [facility];  Service: General;  Laterality: N/A;   • EAR TUBES     • WISDOM TOOTH EXTRACTION       Social History     Socioeconomic History   • Marital status: Single   Tobacco Use   • Smoking status: Never   • Smokeless tobacco: Never   Vaping Use   • Vaping Use: Some days   • Last attempt to quit: 1/1/2020   Substance and Sexual Activity   • Alcohol use: Yes     Comment: A drink everyonce and a while   • Drug use: Never   • Sexual activity: Never     Family History   Problem Relation Age of Onset   • Sleep apnea Mother    • Obesity Mother    • COPD Mother    • Breast cancer Mother 47   • Anxiety disorder Mother    • Cancer Mother         Breast cancer   • Depression Mother    • Mental illness Mother    • Asthma Brother    • Irritable bowel syndrome Brother    • Prostate cancer Maternal Grandfather    • Alcohol abuse  "Maternal Grandfather    • Diabetes Paternal Grandmother    • Sleep apnea Maternal Aunt    • Heart disease Maternal Aunt    • Arthritis Maternal Aunt    • Drug abuse Maternal Aunt    • Kidney disease Maternal Aunt    • Colon cancer Neg Hx    • Ovarian cancer Neg Hx    • Uterine cancer Neg Hx        Review of Systems  Review of Systems   All other systems reviewed and are negative.      Objective   /84   Pulse 89   Temp 97.7 °F (36.5 °C)   Ht 165.1 cm (65\")   Wt 134 kg (295 lb)   SpO2 99%   BMI 49.09 kg/m²   Body mass index is 49.09 kg/m².    Physical Exam  Constitutional:       Appearance: She is well-developed. She is morbidly obese. She is not ill-appearing.   HENT:      Head: Normocephalic.      Right Ear: Tympanic membrane, ear canal and external ear normal.      Left Ear: Tympanic membrane, ear canal and external ear normal.      Nose: Nose normal.      Mouth/Throat:      Mouth: Mucous membranes are moist.      Pharynx: Oropharynx is clear. Uvula midline.   Eyes:      Extraocular Movements: Extraocular movements intact.      Conjunctiva/sclera: Conjunctivae normal.      Pupils: Pupils are equal, round, and reactive to light.   Neck:      Thyroid: No thyromegaly.   Cardiovascular:      Rate and Rhythm: Normal rate and regular rhythm.      Pulses:           Radial pulses are 2+ on the right side and 2+ on the left side.        Dorsalis pedis pulses are 2+ on the right side and 2+ on the left side.      Heart sounds: Normal heart sounds.   Pulmonary:      Effort: Pulmonary effort is normal.      Breath sounds: Normal breath sounds.   Abdominal:      General: Bowel sounds are normal.      Palpations: Abdomen is soft.      Tenderness: There is no abdominal tenderness.   Musculoskeletal:         General: No tenderness or deformity. Normal range of motion.      Cervical back: Full passive range of motion without pain, normal range of motion and neck supple.      Right lower leg: No edema.      Left lower " leg: No edema.   Lymphadenopathy:      Cervical: No cervical adenopathy.   Skin:     General: Skin is warm.      Capillary Refill: Capillary refill takes less than 2 seconds.   Neurological:      Mental Status: She is alert and oriented to person, place, and time.      Sensory: No sensory deficit.      Coordination: Coordination normal.      Gait: Gait normal.      Comments: CN II-XII normal   Psychiatric:         Attention and Perception: Attention normal.         Mood and Affect: Mood and affect normal.         Speech: Speech normal.         Behavior: Behavior normal.         Thought Content: Thought content normal.           Assessment & Plan   1. Healthy female exam.    2. Patient Counseling: Including but not Limited to the following, when appropriate:  --Nutrition: Stressed importance of moderation in sodium/caffeine intake, saturated fat and cholesterol, caloric balance, sufficient intake of fresh fruits, vegetables, fiber, calcium, iron, and 1 mg of folate supplement per day (for females capable of pregnancy).  --Exercise: Stressed the importance of regular exercise.   --Substance Abuse: Discussed cessation/primary prevention of tobacco, alcohol, or other drug use; driving or other dangerous activities under the influence; availability of treatment for abuse, as indicated based on social history.    --Sexuality: Discussed sexually transmitted diseases, partner selection, use of condoms, avoidance of unintended pregnancy  and contraceptive alternatives.   --Injury prevention: Discussed safety belts, safety helmets, smoke detector, smoking near bedding or upholstery.   --Dental health: Discussed importance of regular tooth brushing, flossing, and dental visits.  --Immunizations reviewed.  3. Discussed the patient's BMI with her.  The BMI is above average; BMI management plan is completed  4. Return in about 1 year (around 5/20/2024) for Annual.  5. Age-appropriate Screening Scheduled    Assessment & Plan      Diagnoses and all orders for this visit:    1. Encounter for annual physical examination excluding gynecological examination in a patient older than 17 years (Primary)    2. Class 3 severe obesity due to excess calories without serious comorbidity with body mass index (BMI) of 45.0 to 49.9 in adult        - Class 3 Severe Obesity (BMI >=40). Obesity-related health conditions include the following: none. Obesity is worsening. BMI is is above average; BMI management plan is completed. We discussed low calorie, low carb based diet program, portion control, increasing exercise and consulting a Bariatric surgeon.    3. PCOS (polycystic ovarian syndrome)    4. Psychophysiological insomnia  -     traZODone (DESYREL) 50 MG tablet; Take 1-2 tablets HS as needed for insomnia/anxiety  Dispense: 180 tablet; Refill: 3        - Encouraged to take part in daily physical exercise.          - Eat healthy, well balanced diet; avoid sugary foods or beverages        - Abstain from alcohol and drugs        - Ensure good night's sleep by creating calm space in bedroom, avoiding screen time 1-2 hours before bed, no caffeine after 5 pm    5. Multiple allergies  -     EPINEPHrine (EpiPen 2-Edi) 0.3 MG/0.3ML solution auto-injector injection; Inject 0.3 mL into the appropriate muscle as directed by prescriber 1 (One) Time for 1 dose.  Dispense: 1 each; Refill: 0    6. Duplicated left renal collecting system  -     CT abdomen pelvis w wo contrast; Future    7. LLQ pain  -     Urine Culture - Urine, Urine, Clean Catch; Future  -     CT abdomen pelvis w wo contrast; Future  -     POCT urinalysis dipstick, automated    8. Hematuria, unspecified type  -     Urine Culture - Urine, Urine, Clean Catch; Future  -     CT abdomen pelvis w wo contrast; Future

## 2023-08-02 ENCOUNTER — OFFICE VISIT (OUTPATIENT)
Dept: OBSTETRICS AND GYNECOLOGY | Facility: CLINIC | Age: 23
End: 2023-08-02
Payer: COMMERCIAL

## 2023-08-02 VITALS
WEIGHT: 286.2 LBS | SYSTOLIC BLOOD PRESSURE: 118 MMHG | BODY MASS INDEX: 47.68 KG/M2 | DIASTOLIC BLOOD PRESSURE: 74 MMHG | HEIGHT: 65 IN

## 2023-08-02 DIAGNOSIS — E28.2 PCOS (POLYCYSTIC OVARIAN SYNDROME): ICD-10-CM

## 2023-08-02 DIAGNOSIS — Z01.419 ENCOUNTER FOR ANNUAL ROUTINE GYNECOLOGICAL EXAMINATION: Primary | ICD-10-CM

## 2023-08-02 RX ORDER — ONDANSETRON 4 MG/1
TABLET, ORALLY DISINTEGRATING ORAL
COMMUNITY
Start: 2023-05-23

## 2023-08-02 RX ORDER — CYANOCOBALAMIN 1000 UG/ML
INJECTION, SOLUTION INTRAMUSCULAR; SUBCUTANEOUS
COMMUNITY
Start: 2023-07-11

## 2023-08-02 RX ORDER — ERGOCALCIFEROL 1.25 MG/1
1 CAPSULE ORAL WEEKLY
COMMUNITY
Start: 2023-07-24

## 2023-08-02 RX ORDER — EPINEPHRINE 0.3 MG/.3ML
INJECTION SUBCUTANEOUS
COMMUNITY
Start: 2023-05-20

## 2023-08-02 RX ORDER — MONTELUKAST SODIUM 4 MG/1
1 TABLET, CHEWABLE ORAL EVERY 12 HOURS SCHEDULED
COMMUNITY
Start: 2023-05-19

## 2023-08-02 RX ORDER — NORGESTIMATE AND ETHINYL ESTRADIOL 7DAYSX3 LO
1 KIT ORAL DAILY
Qty: 90 TABLET | Refills: 3 | Status: SHIPPED | OUTPATIENT
Start: 2023-08-02

## 2023-08-04 LAB — REF LAB TEST METHOD: NORMAL

## 2023-08-22 ENCOUNTER — OFFICE VISIT (OUTPATIENT)
Dept: INTERNAL MEDICINE | Facility: CLINIC | Age: 23
End: 2023-08-22
Payer: COMMERCIAL

## 2023-08-22 VITALS
SYSTOLIC BLOOD PRESSURE: 150 MMHG | DIASTOLIC BLOOD PRESSURE: 98 MMHG | OXYGEN SATURATION: 99 % | TEMPERATURE: 97.3 F | BODY MASS INDEX: 48.65 KG/M2 | WEIGHT: 292 LBS | HEART RATE: 91 BPM | HEIGHT: 65 IN

## 2023-08-22 DIAGNOSIS — E28.2 PCOS (POLYCYSTIC OVARIAN SYNDROME): ICD-10-CM

## 2023-08-22 DIAGNOSIS — L30.9 ECZEMA, UNSPECIFIED TYPE: Primary | ICD-10-CM

## 2023-08-22 DIAGNOSIS — N94.6 DYSMENORRHEA: ICD-10-CM

## 2023-08-22 PROCEDURE — 99214 OFFICE O/P EST MOD 30 MIN: CPT | Performed by: STUDENT IN AN ORGANIZED HEALTH CARE EDUCATION/TRAINING PROGRAM

## 2023-08-22 RX ORDER — DROSPIRENONE AND ETHINYL ESTRADIOL 0.02-3(28)
1 KIT ORAL DAILY
Qty: 84 TABLET | Refills: 3 | Status: SHIPPED | OUTPATIENT
Start: 2023-08-22

## 2023-08-22 NOTE — PROGRESS NOTES
Subjective   Nina Munguia is a 23 y.o. female.     History of Present Illness  Patient presents with complaints of rash on her neck going to her scalp.  States that she normally gets eczema flares on her neck different times throughout the year and normally just use an over-the-counter eczema cream and it goes away.  She has been using this cream for about 2 weeks and has not had any improvement.  In fact states that its spreading and going further into her scalp.  States it is very itchy.  Patient states that she has been having spotting with her birth control.  She did see her OB/GYN recently discussed this with him and they told her if it continues to happen that they would just change her birth control.  States she would like us to switch birth controls today so that she does not have to call the other office at this time.  She does state that over the past few months she has been skipping her period week on the birth controls and just going straight to taking the first week pills.  However she is allowed herself to have a period the last 2 months and she states her periods have been horrible extremely painful and extremely heavy.    The following portions of the patient's history were reviewed and updated as appropriate: allergies, current medications, past family history, past medical history, past social history, past surgical history, and problem list.    Review of Systems   All other systems reviewed and are negative.    Objective   Physical Exam  Vitals and nursing note reviewed.   Constitutional:       Appearance: Normal appearance.   HENT:      Head: Normocephalic and atraumatic.      Right Ear: External ear normal.      Left Ear: External ear normal.      Nose: Nose normal.      Mouth/Throat:      Mouth: Mucous membranes are moist.      Pharynx: Oropharynx is clear. No oropharyngeal exudate or posterior oropharyngeal erythema.   Eyes:      Extraocular Movements: Extraocular movements intact.       Conjunctiva/sclera: Conjunctivae normal.      Pupils: Pupils are equal, round, and reactive to light.   Cardiovascular:      Rate and Rhythm: Normal rate and regular rhythm.      Pulses: Normal pulses.      Heart sounds: Normal heart sounds.   Pulmonary:      Effort: Pulmonary effort is normal.      Breath sounds: Normal breath sounds.   Abdominal:      General: Abdomen is flat. Bowel sounds are normal.      Palpations: Abdomen is soft.   Musculoskeletal:         General: Normal range of motion.      Cervical back: Normal range of motion.   Skin:     General: Skin is warm.      Capillary Refill: Capillary refill takes less than 2 seconds.   Neurological:      General: No focal deficit present.      Mental Status: She is alert and oriented to person, place, and time. Mental status is at baseline.   Psychiatric:         Mood and Affect: Mood normal.         Behavior: Behavior normal.         Thought Content: Thought content normal.         Judgment: Judgment normal.       Assessment & Plan   Diagnoses and all orders for this visit:    1. Eczema, unspecified type (Primary)  -     triamcinolone (KENALOG) 0.1 % ointment; Apply 1 application  topically to the appropriate area as directed 2 (Two) Times a Day.  Dispense: 30 g; Refill: 0    2. PCOS (polycystic ovarian syndrome)  -     drospirenone-ethinyl estradiol (TAMMY) 3-0.02 MG per tablet; Take 1 tablet by mouth Daily.  Dispense: 84 tablet; Refill: 3    3. Dysmenorrhea    BP high today but patient states due to increased stress right now with family.  She is working on improving her diet and exercising daily currently.  Told to keep a log of her blood pressure and if it is high throughout the rest of the week she will need to come back in and get blood pressure medication  Stop Ortho Tri-Cyclen.  Start tammy.  Kenalog cream ordered for eczema flare.  Told if she is not better controlled with spotting and heavy periods on Tammy that she will need to see OB/GYN again

## 2023-11-16 ENCOUNTER — OFFICE VISIT (OUTPATIENT)
Dept: INTERNAL MEDICINE | Facility: CLINIC | Age: 23
End: 2023-11-16
Payer: COMMERCIAL

## 2023-11-16 VITALS
SYSTOLIC BLOOD PRESSURE: 110 MMHG | HEART RATE: 85 BPM | DIASTOLIC BLOOD PRESSURE: 72 MMHG | WEIGHT: 292.8 LBS | OXYGEN SATURATION: 97 % | HEIGHT: 65 IN | TEMPERATURE: 97.1 F | BODY MASS INDEX: 48.78 KG/M2

## 2023-11-16 DIAGNOSIS — R63.4 WEIGHT LOSS: Primary | ICD-10-CM

## 2023-11-16 PROCEDURE — 99213 OFFICE O/P EST LOW 20 MIN: CPT | Performed by: STUDENT IN AN ORGANIZED HEALTH CARE EDUCATION/TRAINING PROGRAM

## 2023-11-16 RX ORDER — SEMAGLUTIDE 0.25 MG/.5ML
0.25 INJECTION, SOLUTION SUBCUTANEOUS WEEKLY
Qty: 2 ML | Refills: 3 | Status: SHIPPED | OUTPATIENT
Start: 2023-11-16

## 2023-11-16 NOTE — PROGRESS NOTES
Subjective   Nina Munguia is a 23 y.o. female.     History of Present Illness  Patient presents today with interest in weight loss.  She has implemented healthy diet and exercise multiple days a week with a gym membership.  She has been doing this for about 3 months and has seen no weight changes.  She Has always had issues losing weight despite diet and exercise.  She is interested today in prescription help with this      The following portions of the patient's history were reviewed and updated as appropriate: allergies, current medications, past family history, past medical history, past social history, past surgical history, and problem list.    Review of Systems   All other systems reviewed and are negative.      Objective   Physical Exam  Vitals and nursing note reviewed.   Constitutional:       Appearance: Normal appearance.   HENT:      Head: Normocephalic and atraumatic.      Right Ear: External ear normal.      Left Ear: External ear normal.      Nose: Nose normal.      Mouth/Throat:      Mouth: Mucous membranes are moist.      Pharynx: Oropharynx is clear. No oropharyngeal exudate or posterior oropharyngeal erythema.   Eyes:      Extraocular Movements: Extraocular movements intact.      Conjunctiva/sclera: Conjunctivae normal.      Pupils: Pupils are equal, round, and reactive to light.   Cardiovascular:      Rate and Rhythm: Normal rate and regular rhythm.      Pulses: Normal pulses.      Heart sounds: Normal heart sounds.   Pulmonary:      Effort: Pulmonary effort is normal.      Breath sounds: Normal breath sounds.   Abdominal:      General: Abdomen is flat. Bowel sounds are normal.      Palpations: Abdomen is soft.   Musculoskeletal:         General: Normal range of motion.      Cervical back: Normal range of motion.   Skin:     General: Skin is warm.      Capillary Refill: Capillary refill takes less than 2 seconds.   Neurological:      General: No focal deficit present.      Mental Status:  She is alert and oriented to person, place, and time. Mental status is at baseline.   Psychiatric:         Mood and Affect: Mood normal.         Behavior: Behavior normal.         Thought Content: Thought content normal.         Judgment: Judgment normal.         Assessment & Plan   Diagnoses and all orders for this visit:    1. Weight loss (Primary)  -     Semaglutide-Weight Management (Wegovy) 0.25 MG/0.5ML solution auto-injector; Inject 0.25 mg under the skin into the appropriate area as directed 1 (One) Time Per Week.  Dispense: 2 mL; Refill: 3         Diet and exercise counseling performed today  Decision made to start with Wegovy.  Patient counseled on the risks and benefits.  She is agreeable to start Wegovy.  Counseled on getting enough protein in and drinking enough water while taking the medication

## 2023-11-27 ENCOUNTER — PRIOR AUTHORIZATION (OUTPATIENT)
Dept: INTERNAL MEDICINE | Facility: CLINIC | Age: 23
End: 2023-11-27
Payer: COMMERCIAL

## 2023-11-27 NOTE — TELEPHONE ENCOUNTER
Nina Munguia (Key: [id number])  Rx #: [id number]  Wegovy 0.25MG/0.5ML auto-injectors     Form  [employer] Electronic Prior Authorization Form (2017 NCPDP)  Created  Sent to Plan  Plan Response  Submit Clinical Questions  Please wait for [employer] 2017 NCPDP to return a determination.

## 2023-12-22 RX ORDER — METFORMIN HYDROCHLORIDE 500 MG/1
500 TABLET, EXTENDED RELEASE ORAL
Qty: 90 TABLET | Refills: 3 | Status: SHIPPED | OUTPATIENT
Start: 2023-12-22

## 2024-01-12 ENCOUNTER — TELEPHONE (OUTPATIENT)
Dept: INTERNAL MEDICINE | Facility: CLINIC | Age: 24
End: 2024-01-12
Payer: COMMERCIAL

## 2024-01-12 NOTE — TELEPHONE ENCOUNTER
Left VM advising patient of provider reply, also advised to call office to schedule appointment to discuss other options.    79

## 2024-01-12 NOTE — TELEPHONE ENCOUNTER
Caller: Nina Munguia Marisela    Relationship: Self    Best call back number: [phone]     What is the best time to reach you: ANYTIME    Who are you requesting to speak with (clinical staff, provider,  specific staff member): DR OR CLINICAL STAFF    Do you know the name of the person who called: THE PATIENT CALLED IN    What was the call regarding: THE PATIENT WOULD LIKE TO KNOW IF THE PROVIDER IS WILLING TO GIVE THE PATIENT ADIPEX OR PHENTERMINE FOR WEIGHT LOSS?  PLEASE CALL THE PATIENT  BACK TO LET HER KNOW.     Is it okay if the provider responds through MyChart: EITHER     PHARMACY   [facility] IN [place]

## 2024-03-15 DIAGNOSIS — F51.04 PSYCHOPHYSIOLOGICAL INSOMNIA: ICD-10-CM

## 2024-03-15 RX ORDER — TRAZODONE HYDROCHLORIDE 50 MG/1
TABLET ORAL
Qty: 180 TABLET | Refills: 3 | Status: SHIPPED | OUTPATIENT
Start: 2024-03-15

## 2024-03-15 NOTE — TELEPHONE ENCOUNTER
Caller: Nina Munguia Marisela    Relationship: Self    Best call back number: [phone]     Requested Prescriptions:   Requested Prescriptions     Pending Prescriptions Disp Refills    traZODone (DESYREL) 50 MG tablet 180 tablet 3     Sig: Take 1-2 tablets HS as needed for insomnia/anxiety        Pharmacy where request should be sent: [facility] - [place], [place] - [address] - [phone]  - [phone] FX     Last office visit with prescribing clinician: 5/20/2023   Last telemedicine visit with prescribing clinician: Visit date not found   Next office visit with prescribing clinician: Visit date not found     Additional details provided by patient:     Does the patient have less than a 3 day supply:  [] Yes  [x] No    Would you like a call back once the refill request has been completed: [] Yes [x] No    If the office needs to give you a call back, can they leave a voicemail: [] Yes [x] No    Laly Quezada Rep   03/15/24 16:14 EDT

## 2024-04-10 DIAGNOSIS — F51.04 PSYCHOPHYSIOLOGICAL INSOMNIA: ICD-10-CM

## 2024-04-10 RX ORDER — TRAZODONE HYDROCHLORIDE 50 MG/1
TABLET ORAL
Qty: 90 TABLET | Refills: 0 | Status: SHIPPED | OUTPATIENT
Start: 2024-04-10

## 2024-07-19 ENCOUNTER — TELEPHONE (OUTPATIENT)
Dept: INTERNAL MEDICINE | Facility: CLINIC | Age: 24
End: 2024-07-19
Payer: COMMERCIAL

## 2024-07-19 NOTE — TELEPHONE ENCOUNTER
Called in trazodone 50 mg, 1 tablet qhs number 30 with 0 refills to [facility] in [place]. Notation that she needs an appt. Also called patient to let her know, couldn't schedule due to epic being down.

## 2024-07-26 DIAGNOSIS — E28.2 PCOS (POLYCYSTIC OVARIAN SYNDROME): ICD-10-CM

## 2024-07-26 RX ORDER — DROSPIRENONE AND ETHINYL ESTRADIOL TABLETS 0.02-3(28)
1 KIT ORAL DAILY
Qty: 56 TABLET | Refills: 0 | Status: SHIPPED | OUTPATIENT
Start: 2024-07-26

## 2024-07-31 ENCOUNTER — OFFICE VISIT (OUTPATIENT)
Dept: INTERNAL MEDICINE | Facility: CLINIC | Age: 24
End: 2024-07-31
Payer: COMMERCIAL

## 2024-07-31 VITALS
BODY MASS INDEX: 44.15 KG/M2 | DIASTOLIC BLOOD PRESSURE: 70 MMHG | HEIGHT: 65 IN | TEMPERATURE: 98.1 F | WEIGHT: 265 LBS | SYSTOLIC BLOOD PRESSURE: 116 MMHG | HEART RATE: 93 BPM | OXYGEN SATURATION: 98 %

## 2024-07-31 DIAGNOSIS — Z13.228 SCREENING FOR ENDOCRINE, METABOLIC AND IMMUNITY DISORDER: ICD-10-CM

## 2024-07-31 DIAGNOSIS — E28.2 PCOS (POLYCYSTIC OVARIAN SYNDROME): ICD-10-CM

## 2024-07-31 DIAGNOSIS — H92.02 EARACHE ON LEFT: ICD-10-CM

## 2024-07-31 DIAGNOSIS — Q62.5 CONGENITAL DUPLICATION OF COLLECTING SYSTEM OF KIDNEY: ICD-10-CM

## 2024-07-31 DIAGNOSIS — E66.01 CLASS 3 SEVERE OBESITY DUE TO EXCESS CALORIES WITHOUT SERIOUS COMORBIDITY WITH BODY MASS INDEX (BMI) OF 40.0 TO 44.9 IN ADULT: Primary | ICD-10-CM

## 2024-07-31 DIAGNOSIS — Z13.0 SCREENING FOR ENDOCRINE, METABOLIC AND IMMUNITY DISORDER: ICD-10-CM

## 2024-07-31 DIAGNOSIS — Z13.29 SCREENING FOR ENDOCRINE, METABOLIC AND IMMUNITY DISORDER: ICD-10-CM

## 2024-07-31 DIAGNOSIS — F51.04 PSYCHOPHYSIOLOGICAL INSOMNIA: ICD-10-CM

## 2024-07-31 DIAGNOSIS — Z51.81 ENCOUNTER FOR THERAPEUTIC DRUG MONITORING: ICD-10-CM

## 2024-07-31 DIAGNOSIS — Z13.0 SCREENING FOR DISORDER OF BLOOD AND BLOOD-FORMING ORGANS: ICD-10-CM

## 2024-07-31 DIAGNOSIS — Z96.22 HISTORY OF PLACEMENT OF EAR TUBES: ICD-10-CM

## 2024-07-31 PROCEDURE — 1126F AMNT PAIN NOTED NONE PRSNT: CPT | Performed by: NURSE PRACTITIONER

## 2024-07-31 PROCEDURE — 99214 OFFICE O/P EST MOD 30 MIN: CPT | Performed by: NURSE PRACTITIONER

## 2024-07-31 RX ORDER — TRAZODONE HYDROCHLORIDE 50 MG/1
TABLET ORAL
Qty: 90 TABLET | Refills: 1 | Status: SHIPPED | OUTPATIENT
Start: 2024-07-31

## 2024-07-31 NOTE — PROGRESS NOTES
Office Visit      Patient Name: Nina Munguia  : 2000   MRN: [id number]     Chief Complaint:    Chief Complaint   Patient presents with    Earache       History of Present Illness  Nina Munguia is a 24 y.o. female who presents for evaluation of multiple medical concerns.    She reports an improvement in her right ear condition, now being able to pop it externally. However, she has a tube in her left ear, which causes shooting pain down her jaw. Due to her job loss and lack of insurance, she is seeking a referral to a different ENT specialist. She also mentions a habit of picking at her skin when her ears become dry.    She has been experiencing severe headaches for the past month, which extend to the top of her back. These headaches are severe enough to cause her to feel unwell. She also reports difficulty reading, blurry vision when not wearing her glasses, and occasional nausea. She denies any history of head injuries. She also describes a sensation like a band around her head. She denies family history of migraine headaches.    She has been taking Adipex since mid- 2024, which has been beneficial. Starting weight 292 lbs. Her goal weight is 220 pounds. She is due to discontinue Adipex in 2024. She has a history of Polycystic Ovary Syndrome (PCOS) and prediabetes in her younger years. She has tried various diets, including dairy and gluten, and gym workouts, but found no success. She has stopped drinking soda since 2024. Her stepfather is a recovered drug addict. He has been clean for over a year now. Her father did some drugs, so she does not talk to him. She keeps adipex locked up, away from others.     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 She previously saw a urologist, Dr. Cartagena. She has not followed up since. She reports frequent urination, which she attributes to drinking a  "lot of water. She has had issues since childhood. She is not sexually active.    She called a few weeks ago for a refill of her trazodone prescription. Works well for insomnia.               Subjective      I have reviewed and the following portions of the patient's history were updated as appropriate: past family history, past medical history, past social history, past surgical history and problem list.      Current Outpatient Medications:     Adipex-P 37.5 MG tablet, Take 1 tablet by mouth Every Morning Before Breakfast., Disp: 30 tablet, Rfl: 2    traZODone (DESYREL) 50 MG tablet, TAKE 1 TABLET BY MOUTH AT NIGHT AS NEEDED FOR SLEEP, Disp: 90 tablet, Rfl: 1    ciprofloxacin-dexAMETHasone (CIPRODEX) 0.3-0.1 % otic suspension, INSTILL 4 DROPS INTO AFFECTED EAR(S) BY OTIC ROUTE 2 TIMES PER DAY FOR 7 DAYS, Disp: , Rfl:     EPINEPHrine (EPIPEN) 0.3 MG/0.3ML solution auto-injector injection, Inject  into the appropriate muscle as directed by prescriber., Disp: , Rfl:     Loryna 3-0.02 MG per tablet, Take 1 tablet by mouth once daily, Disp: 56 tablet, Rfl: 0    Allergies   Allergen Reactions    Banana Anaphylaxis    Cat Hair Extract Unknown - Low Severity     Nose stuffy, throat scratchy, eye swelling    Dust Mite Extract Unknown - Low Severity     Eye swelling, throat scratchy, stuffy nose.    Pineapple Swelling     Swelling in the throat      Metformin GI Intolerance    Milk-Related Compounds GI Intolerance       Objective     Physical Exam:  Vital Signs:   Vitals:    07/31/24 0935   BP: 116/70   Pulse: 93   Temp: 98.1 °F (36.7 °C)   SpO2: 98%   Weight: 120 kg (265 lb)   Height: 165.1 cm (65\")     Body mass index is 44.1 kg/m².         Physical Exam  Constitutional:       Appearance: She is morbidly obese. She is not ill-appearing.   HENT:      Head: Normocephalic.      Right Ear: Tympanic membrane, ear canal and external ear normal.      Left Ear: Ear canal and external ear normal. A PE tube is present.   Eyes:      " Conjunctiva/sclera: Conjunctivae normal.      Pupils: Pupils are equal, round, and reactive to light.   Cardiovascular:      Rate and Rhythm: Normal rate and regular rhythm.      Pulses:           Radial pulses are 2+ on the right side and 2+ on the left side.        Dorsalis pedis pulses are 2+ on the right side and 2+ on the left side.      Heart sounds: Normal heart sounds.   Pulmonary:      Effort: Pulmonary effort is normal.      Breath sounds: Normal breath sounds.   Musculoskeletal:      Cervical back: Normal range of motion and neck supple.      Right lower leg: No edema.      Left lower leg: No edema.   Skin:     General: Skin is warm.      Capillary Refill: Capillary refill takes less than 2 seconds.   Neurological:      Mental Status: She is alert and oriented to person, place, and time.      Coordination: Coordination normal.      Gait: Gait normal.   Psychiatric:         Mood and Affect: Mood normal.         Behavior: Behavior normal.         Thought Content: Thought content normal.             Assessment / Plan      Assessment/Plan:   Diagnoses and all orders for this visit:    1. Class 3 severe obesity due to excess calories without serious comorbidity with body mass index (BMI) of 40.0 to 44.9 in adult (Primary)  -     Adipex-P 37.5 MG tablet; Take 1 tablet by mouth Every Morning Before Breakfast.  Dispense: 30 tablet; Refill: 2.  Will continue to prescribe this medication as she continues to lose weight without side effects.    -     Vitamin D,25-Hydroxy    2. Psychophysiological insomnia  -     traZODone (DESYREL) 50 MG tablet; TAKE 1 TABLET BY MOUTH AT NIGHT AS NEEDED FOR SLEEP  Dispense: 90 tablet; Refill: 1        - Encouraged to take part in daily physical exercise.          - Eat healthy, well balanced diet; avoid sugary foods or beverages        - Abstain from alcohol and drugs        - Ensure good night's sleep by creating calm space in bedroom, avoiding screen time 1-2 hours before bed, no  caffeine after 5 pm    3. Earache on left  -     Ambulatory Referral to ENT (Otolaryngology)    4. History of placement of ear tubes  -     Ambulatory Referral to ENT (Otolaryngology)    5. 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 PCOS (polycystic ovarian syndrome)  -     Hemoglobin A1c  -     Lipid Panel    7. Screening for endocrine, metabolic and immunity disorder  -     Comprehensive Metabolic Panel    8. Screening for disorder of blood and blood-forming organs  -     CBC & Differential    9. Encounter for therapeutic drug monitoring  -     Urine Drug Screen - Urine, Clean Catch    Follow Up:   Return in about 4 months (around 11/30/2024) for Annual.    Patient was given instructions and counseling regarding her condition or for health maintenance advice. Please see specific information pulled into the AVS if appropriate.       Primary Care [facility]     Please note that portions of this note may have been completed with a voice recognition program. Efforts were made to edit dictation, but occasionally words are mistranscribed.

## 2024-08-01 LAB
AMPHETAMINES UR QL SCN: NEGATIVE NG/ML
BARBITURATES UR QL SCN: NEGATIVE NG/ML
BENZODIAZ UR QL SCN: NEGATIVE NG/ML
BZE UR QL SCN: NEGATIVE NG/ML
CANNABINOIDS UR QL SCN: NEGATIVE NG/ML
CREAT UR-MCNC: 124 MG/DL (ref 20–300)
LABORATORY COMMENT REPORT: NORMAL
METHADONE UR QL SCN: NEGATIVE NG/ML
OPIATES UR QL SCN: NEGATIVE NG/ML
OXYCODONE+OXYMORPHONE UR QL SCN: NEGATIVE NG/ML
PCP UR QL: NEGATIVE NG/ML
PH UR: 6.5 [PH] (ref 4.5–8.9)
PROPOXYPH UR QL SCN: NEGATIVE NG/ML

## 2024-08-02 LAB
25(OH)D3+25(OH)D2 SERPL-MCNC: 25.1 NG/ML (ref 30–100)
ALBUMIN SERPL-MCNC: 4 G/DL (ref 4–5)
ALP SERPL-CCNC: 56 IU/L (ref 44–121)
ALT SERPL-CCNC: 21 IU/L (ref 0–32)
AST SERPL-CCNC: 10 IU/L (ref 0–40)
BASOPHILS # BLD AUTO: 0.1 X10E3/UL (ref 0–0.2)
BASOPHILS NFR BLD AUTO: 1 %
BILIRUB SERPL-MCNC: <0.2 MG/DL (ref 0–1.2)
BUN SERPL-MCNC: 14 MG/DL (ref 6–20)
BUN/CREAT SERPL: 18 (ref 9–23)
CALCIUM SERPL-MCNC: 9.3 MG/DL (ref 8.7–10.2)
CHLORIDE SERPL-SCNC: 104 MMOL/L (ref 96–106)
CHOLEST SERPL-MCNC: 164 MG/DL (ref 100–199)
CO2 SERPL-SCNC: 22 MMOL/L (ref 20–29)
CREAT SERPL-MCNC: 0.78 MG/DL (ref 0.57–1)
EGFRCR SERPLBLD CKD-EPI 2021: 109 ML/MIN/1.73
EOSINOPHIL # BLD AUTO: 0.2 X10E3/UL (ref 0–0.4)
EOSINOPHIL NFR BLD AUTO: 2 %
ERYTHROCYTE [DISTWIDTH] IN BLOOD BY AUTOMATED COUNT: 12.5 % (ref 11.7–15.4)
GLOBULIN SER CALC-MCNC: 2.7 G/DL (ref 1.5–4.5)
GLUCOSE SERPL-MCNC: 84 MG/DL (ref 70–99)
HBA1C MFR BLD: 5.4 % (ref 4.8–5.6)
HCT VFR BLD AUTO: 42.5 % (ref 34–46.6)
HDLC SERPL-MCNC: 55 MG/DL
HGB BLD-MCNC: 13.5 G/DL (ref 11.1–15.9)
IMM GRANULOCYTES # BLD AUTO: 0 X10E3/UL (ref 0–0.1)
IMM GRANULOCYTES NFR BLD AUTO: 0 %
LDLC SERPL CALC-MCNC: 86 MG/DL (ref 0–99)
LYMPHOCYTES # BLD AUTO: 3.4 X10E3/UL (ref 0.7–3.1)
LYMPHOCYTES NFR BLD AUTO: 31 %
MCH RBC QN AUTO: 29 PG (ref 26.6–33)
MCHC RBC AUTO-ENTMCNC: 31.8 G/DL (ref 31.5–35.7)
MCV RBC AUTO: 91 FL (ref 79–97)
MONOCYTES # BLD AUTO: 0.6 X10E3/UL (ref 0.1–0.9)
MONOCYTES NFR BLD AUTO: 6 %
NEUTROPHILS # BLD AUTO: 6.7 X10E3/UL (ref 1.4–7)
NEUTROPHILS NFR BLD AUTO: 60 %
PLATELET # BLD AUTO: 363 X10E3/UL (ref 150–450)
POTASSIUM SERPL-SCNC: 4.7 MMOL/L (ref 3.5–5.2)
PROT SERPL-MCNC: 6.7 G/DL (ref 6–8.5)
RBC # BLD AUTO: 4.66 X10E6/UL (ref 3.77–5.28)
SODIUM SERPL-SCNC: 140 MMOL/L (ref 134–144)
TRIGL SERPL-MCNC: 128 MG/DL (ref 0–149)
VLDLC SERPL CALC-MCNC: 23 MG/DL (ref 5–40)
WBC # BLD AUTO: 11 X10E3/UL (ref 3.4–10.8)

## 2024-08-02 NOTE — PROGRESS NOTES
Vitamin D insufficient.  Take an over-the-counter vitamin D3 supplement, 2000 IU, once a day.  Take with calcium to enhance absorption.  Sit with face and forearms exposed to the sun for 15 minutes a day to enhance absorption of vitamin D through the skin.  White blood cells just outside normal range, nothing concerning at this time.  Remainder of labs are normal.

## 2024-08-08 ENCOUNTER — OFFICE VISIT (OUTPATIENT)
Dept: OBSTETRICS AND GYNECOLOGY | Facility: CLINIC | Age: 24
End: 2024-08-08
Payer: COMMERCIAL

## 2024-08-08 VITALS — DIASTOLIC BLOOD PRESSURE: 70 MMHG | BODY MASS INDEX: 43.93 KG/M2 | WEIGHT: 264 LBS | SYSTOLIC BLOOD PRESSURE: 124 MMHG

## 2024-08-08 DIAGNOSIS — E28.2 PCOS (POLYCYSTIC OVARIAN SYNDROME): Primary | ICD-10-CM

## 2024-08-08 DIAGNOSIS — Z01.419 WOMEN'S ANNUAL ROUTINE GYNECOLOGICAL EXAMINATION: ICD-10-CM

## 2024-08-08 DIAGNOSIS — Z30.41 ENCOUNTER FOR SURVEILLANCE OF CONTRACEPTIVE PILLS: ICD-10-CM

## 2024-08-08 PROBLEM — R10.11 RIGHT UPPER QUADRANT ABDOMINAL PAIN: Status: RESOLVED | Noted: 2022-02-16 | Resolved: 2024-08-08

## 2024-08-08 PROBLEM — R10.13 EPIGASTRIC PAIN: Status: RESOLVED | Noted: 2021-01-19 | Resolved: 2024-08-08

## 2024-08-08 PROBLEM — R11.0 NAUSEA: Status: RESOLVED | Noted: 2021-01-19 | Resolved: 2024-08-08

## 2024-08-08 PROBLEM — R19.7 DIARRHEA: Status: RESOLVED | Noted: 2021-01-19 | Resolved: 2024-08-08

## 2024-08-08 RX ORDER — CIPROFLOXACIN AND DEXAMETHASONE 3; 1 MG/ML; MG/ML
SUSPENSION/ DROPS AURICULAR (OTIC)
COMMUNITY
Start: 2024-04-08

## 2024-08-08 NOTE — PROGRESS NOTES
Gynecologic Annual Exam Note        Gynecologic Exam        Subjective     HPI  Nina Munguia is a 24 y.o.  female who presents for annual well woman exam as a established patient. There were no changes to her medical or surgical history since her last visit.. No LMP recorded (lmp unknown). (Menstrual status: Oral contraceptives). Her periods are absent secondary to birth control. . She reports dysmenorrhea is mild occurring  when mensus would occur . Marital Status: single.  She has never been sexually active. STD testing recommendations have been explained to the patient and she does not desire STD testing.    The patient would like to discuss the following complaints today: PCOS, she states her PCOS symptoms are suppressed since being on OCP. But would like to make sure that isn't anything else she should be doing to follow up her diagnosis of PCOS    Additional OB/GYN History   contraceptive methods: OCP (estrogen/progesterone)  Desires to: continue contraception  Thromboembolic Disease: none  History of migraines: yes without aura  Age of menarche: 14    History of STD: no    Last Pap : 23. Results: negative. HPV: not done.   Last Completed Pap Smear            PAP SMEAR (Every 3 Years) Next due on 2023  LIQUID-BASED PAP SMEAR WITH HPV GENOTYPING IF ASCUS ([facility])    2022  LIQUID-BASED PAP SMEAR, [facility] ([facility])                     History of abnormal Pap smear: no  Gardasil status:completed  Family history of uterine, colon, breast, or ovarian cancer: Mother: breast   Performs monthly Self-Breast Exam: yes  Exercises Regularly:yes  Feelings of Anxiety or Depression: no  Tobacco Usage?: No       Current Outpatient Medications:     Adipex-P 37.5 MG tablet, Take 1 tablet by mouth Every Morning Before Breakfast., Disp: 30 tablet, Rfl: 2    ciprofloxacin-dexAMETHasone (CIPRODEX) 0.3-0.1 % otic suspension, INSTILL 4 DROPS INTO AFFECTED EAR(S) BY OTIC ROUTE  2 TIMES PER DAY FOR 7 DAYS, Disp: , Rfl:     EPINEPHrine (EPIPEN) 0.3 MG/0.3ML solution auto-injector injection, Inject  into the appropriate muscle as directed by prescriber., Disp: , Rfl:     Loryna 3-0.02 MG per tablet, Take 1 tablet by mouth once daily, Disp: 56 tablet, Rfl: 0    traZODone (DESYREL) 50 MG tablet, TAKE 1 TABLET BY MOUTH AT NIGHT AS NEEDED FOR SLEEP, Disp: 90 tablet, Rfl: 1     Patient is requesting refills of Loryna.    OB History          0    Para   0    Term   0       0    AB   0    Living   0         SAB   0    IAB   0    Ectopic   0    Molar   0    Multiple   0    Live Births   0                Health Maintenance   Topic Date Due    ANNUAL PHYSICAL  2024    Annual Gynecologic Pelvic and Breast Exam  2024    INFLUENZA VACCINE  2024    COVID-19 Vaccine (2023- season) 10/20/2024 (Originally 2023)    TDAP/TD VACCINES (2 - Td or Tdap) 2025 (Originally 5/15/2022)    BMI FOLLOWUP  2025    PAP SMEAR  2026    HEPATITIS C SCREENING  Completed    HPV VACCINES  Completed    Pneumococcal Vaccine 0-64  Aged Out    CHLAMYDIA SCREENING  Discontinued       Past Medical History:   Diagnosis Date    ADHD (attention deficit hyperactivity disorder)     I dont know exact date, i was in the 3rd grade    Allergic     seasonal allergies    Asthma     Cholelithiasis     Gallstones    Hypertension     Just in the emergency room.  She reports normal in PCP office.    Kidney infection     Kidney stone 2023    Obesity     PCOS (polycystic ovarian syndrome)     Piercing     nose piercing    Polycystic ovary syndrome     I was 13 or 14 when diagnosed.    Tattoo     UTI (urinary tract infection)         Past Surgical History:   Procedure Laterality Date    CHOLECYSTECTOMY  2022    I know it was in [facility]     CHOLECYSTECTOMY WITH INTRAOPERATIVE CHOLANGIOGRAM N/A 2022    Procedure: CHOLECYSTECTOMY LAPAROSCOPIC INTRAOPERATIVE CHOLANGIOGRAPHY;   Surgeon: Vernon Avila MD;  Location: [facility];  Service: General;  Laterality: N/A;    EAR TUBES      LAPAROSCOPIC CHOLECYSTECTOMY  2/2022    WISDOM TOOTH EXTRACTION         The additional following portions of the patient's history were reviewed and updated as appropriate: allergies, current medications, past family history, past medical history, past social history, past surgical history, and problem list.    Review of Systems      I have reviewed and agree with the HPI, ROS, and historical information as entered above. Laurel Franklin MD          Objective   /70   Wt 120 kg (264 lb)   LMP  (LMP Unknown) Comment: Continous OCP  BMI 43.93 kg/m²     Physical Exam  Vitals and nursing note reviewed. Exam conducted with a chaperone present.   Constitutional:       Appearance: Normal appearance.   HENT:      Head: Normocephalic and atraumatic.   Eyes:      Pupils: Pupils are equal, round, and reactive to light.   Pulmonary:      Effort: Pulmonary effort is normal.      Breath sounds: Normal breath sounds.   Chest:      Chest wall: No tenderness.   Breasts:     Right: No inverted nipple, mass, nipple discharge, skin change or tenderness.      Left: No inverted nipple, mass, nipple discharge, skin change or tenderness.   Abdominal:      General: There is no distension.      Palpations: Abdomen is soft.      Tenderness: There is no abdominal tenderness.   Musculoskeletal:         General: Normal range of motion.      Cervical back: Normal range of motion and neck supple.      Right lower leg: No edema.      Left lower leg: No edema.   Lymphadenopathy:      Upper Body:      Right upper body: No axillary adenopathy.      Left upper body: No axillary adenopathy.   Skin:     General: Skin is warm and dry.   Neurological:      General: No focal deficit present.      Mental Status: She is alert and oriented to person, place, and time.   Psychiatric:         Mood and Affect: Mood normal.         Behavior: Behavior  normal. Behavior is cooperative.            Assessment and Plan    Problem List Items Addressed This Visit          Gynecologic and Obstetric Problems    PCOS (polycystic ovarian syndrome) - Primary    Relevant Medications    Loryna 3-0.02 MG per tablet     Other Visit Diagnoses       Women's annual routine gynecological examination        Encounter for surveillance of contraceptive pills                GYN annual well woman exam. She has been working on weight loss, lost 30 lbs since jan.   Reviewed pap guidelines.   OCP's/Vaginal Ring - Discussed side effects of nausea, BTB, headaches, breast tenderness and slight weight gain in the first three cycles.  Understands risks of blood clots, stroke, and theoretical risk of breast cancer.  Denies family history of blood clots.  Reviewed monthly self breast exams.  Instructed to call with lumps, pain, or breast discharge.    Reviewed BMI and weight loss as preventative health measures.   Reviewed exercise as a preventative health measures.   RTC in 1 year or PRN with problems  Return in about 1 year (around 8/8/2025) for Annual physical.    Laurel Franklin MD  08/08/2024

## 2024-08-14 ENCOUNTER — TELEPHONE (OUTPATIENT)
Dept: INTERNAL MEDICINE | Facility: CLINIC | Age: 24
End: 2024-08-14

## 2024-08-15 ENCOUNTER — TELEPHONE (OUTPATIENT)
Dept: INTERNAL MEDICINE | Facility: CLINIC | Age: 24
End: 2024-08-15
Payer: COMMERCIAL

## 2024-08-15 ENCOUNTER — TELEPHONE (OUTPATIENT)
Dept: INTERNAL MEDICINE | Facility: CLINIC | Age: 24
End: 2024-08-15

## 2024-08-15 NOTE — TELEPHONE ENCOUNTER
Caller: Nina Munguia    Relationship: Self    Best call back number: [phone]     Which medication are you concerned about: ADIPEX-P 37.5 MG    Who prescribed you this medication: SARATH BOYD    When did you start taking this medication: NO STARTED YET    What are your concerns: PHARMACY ADVISED THE PATIENT THAT IT IS $88 AND PATIENT WAS ADVISED BY PHARMACY TO GET HER PCP TO SEND IN THE GENERIC VERSION OF THIS MEDICATION.

## 2024-08-15 NOTE — TELEPHONE ENCOUNTER
Patient called to get a hospital follow up for severe kidney infection. Discharge date of 08/13. Unable to schedule with in 2 wk time frame. Please advise.

## 2024-08-16 NOTE — TELEPHONE ENCOUNTER
CALLED AND SCHEDULED PATIENT WITH [facility] ON 08/28/2024 FOR HOSPITAL FOLLOW UP PER KAMALA'S REQUEST.

## 2024-08-21 ENCOUNTER — TELEPHONE (OUTPATIENT)
Dept: INTERNAL MEDICINE | Facility: CLINIC | Age: 24
End: 2024-08-21
Payer: COMMERCIAL

## 2024-08-21 DIAGNOSIS — E66.01 CLASS 3 SEVERE OBESITY DUE TO EXCESS CALORIES WITHOUT SERIOUS COMORBIDITY WITH BODY MASS INDEX (BMI) OF 40.0 TO 44.9 IN ADULT: Primary | ICD-10-CM

## 2024-08-21 RX ORDER — PHENTERMINE HYDROCHLORIDE 37.5 MG/1
37.5 CAPSULE ORAL EVERY MORNING
Qty: 30 CAPSULE | Refills: 2 | Status: SHIPPED | OUTPATIENT
Start: 2024-08-21

## 2024-08-21 NOTE — TELEPHONE ENCOUNTER
2ND CALL    Caller: Nina Munguia    Relationship: Self    Best call back number: [phone]    Which medication are you concerned about: Adipex-P 37.5 MG tablet     Who prescribed you this medication: SARATH GIOVANNI    When did you start taking this medication: HASN'T YET BECAUSE SHE NEEDS THE GENERIC BRAND. THIS BRAND IS $80.00     What are your concerns: PHARMACY HASN'T REC'D ANYTHING FROM YOU REGARDING THIS CHANGE.    How long have you had these concerns: ABOUT A WEEK       [id number] - [place], [place] - [address] [phone] [facility] [phone]

## 2024-08-28 ENCOUNTER — OFFICE VISIT (OUTPATIENT)
Dept: INTERNAL MEDICINE | Facility: CLINIC | Age: 24
End: 2024-08-28
Payer: COMMERCIAL

## 2024-08-28 ENCOUNTER — TELEPHONE (OUTPATIENT)
Dept: INTERNAL MEDICINE | Facility: CLINIC | Age: 24
End: 2024-08-28

## 2024-08-28 VITALS
DIASTOLIC BLOOD PRESSURE: 68 MMHG | HEART RATE: 106 BPM | OXYGEN SATURATION: 99 % | HEIGHT: 65 IN | WEIGHT: 263 LBS | TEMPERATURE: 98.1 F | SYSTOLIC BLOOD PRESSURE: 110 MMHG | BODY MASS INDEX: 43.82 KG/M2

## 2024-08-28 DIAGNOSIS — H92.02 EARACHE ON LEFT: ICD-10-CM

## 2024-08-28 DIAGNOSIS — Z96.22 HISTORY OF PLACEMENT OF EAR TUBES: ICD-10-CM

## 2024-08-28 DIAGNOSIS — Q62.5 CONGENITAL DUPLICATION OF COLLECTING SYSTEM OF KIDNEY: Primary | ICD-10-CM

## 2024-08-28 DIAGNOSIS — N39.0 URINARY TRACT INFECTION WITHOUT HEMATURIA, SITE UNSPECIFIED: ICD-10-CM

## 2024-08-28 DIAGNOSIS — Z87.442 HISTORY OF KIDNEY STONES: ICD-10-CM

## 2024-08-28 PROCEDURE — 99214 OFFICE O/P EST MOD 30 MIN: CPT | Performed by: NURSE PRACTITIONER

## 2024-08-28 PROCEDURE — 1159F MED LIST DOCD IN RCRD: CPT | Performed by: NURSE PRACTITIONER

## 2024-08-28 PROCEDURE — 1126F AMNT PAIN NOTED NONE PRSNT: CPT | Performed by: NURSE PRACTITIONER

## 2024-08-28 PROCEDURE — 1160F RVW MEDS BY RX/DR IN RCRD: CPT | Performed by: NURSE PRACTITIONER

## 2024-08-28 NOTE — PROGRESS NOTES
Office Visit      Patient Name: Nina Munguia  : 2000   MRN: [id number]     Chief Complaint:    Chief Complaint   Patient presents with    Hospital Follow Up Visit     Sciatic pain, uti       History of Present Illness: Nina Munguia is a 24 y.o. female who is here today for follow-up after presenting to [facility] emergency department on 2024 with left-sided flank pain that had become in the morning.  Well the day before, woke up with severe and sharp left flank pain that did not radiate.  History of kidney stones, frequent UTI, atrophic kidney on the right.  Temperature elevated at 100 degrees, tachycardic.  She was given IV ceftriaxone, morphine, ondansetron, ketorolac.  CTAP showed no acute findings.  Urinalysis mild-moderately abnormal.  Discharged with cephalexin, tramadol. She has not completed antibiotic, understood she was to take it until symptoms improved.  She took tramadol once, made her irrationally angry.  Feeling much better.  No urinary frequency, urgency, difficulty starting urine stream, fever, lower back pain, pelvic pressure, nausea.  Previously referred to urology for congenital duplication of collecting system of kidney, appointment not scheduled until October.  She is wondering if that appointment can be moved up due to recent UTI.  Referred to ENT, ear tube in place in left ear.  Ear continues to hurt at times.  Has not received phone call to schedule an appointment.  Given the name of provider she was referred to, patient to call and make an appointment.    Subjective      I have reviewed and the following portions of the patient's history were updated as appropriate: past family history, past medical history, past social history, past surgical history and problem list.      Current Outpatient Medications:     ciprofloxacin-dexAMETHasone (CIPRODEX) 0.3-0.1 % otic suspension, INSTILL 4 DROPS INTO AFFECTED EAR(S) BY OTIC ROUTE 2 TIMES PER DAY FOR 7  "DAYS, Disp: , Rfl:     EPINEPHrine (EPIPEN) 0.3 MG/0.3ML solution auto-injector injection, Inject  into the appropriate muscle as directed by prescriber., Disp: , Rfl:     Loryna 3-0.02 MG per tablet, Take 1 tablet by mouth once daily, Disp: 56 tablet, Rfl: 0    phentermine 37.5 MG capsule, Take 1 capsule by mouth Every Morning., Disp: 30 capsule, Rfl: 2    traZODone (DESYREL) 50 MG tablet, TAKE 1 TABLET BY MOUTH AT NIGHT AS NEEDED FOR SLEEP, Disp: 90 tablet, Rfl: 1    Allergies   Allergen Reactions    Banana Anaphylaxis    Cat Hair Extract Unknown - Low Severity     Nose stuffy, throat scratchy, eye swelling    Dust Mite Extract Unknown - Low Severity     Eye swelling, throat scratchy, stuffy nose.    Pineapple Swelling     Swelling in the throat      Metformin GI Intolerance    Tramadol Irritability    Milk-Related Compounds GI Intolerance       Objective     Physical Exam:  Vital Signs:   Vitals:    08/28/24 0852   BP: 110/68   Pulse: 106   Temp: 98.1 °F (36.7 °C)   SpO2: 99%   Weight: 119 kg (263 lb)   Height: 165.1 cm (65\")     Body mass index is 43.77 kg/m².         Physical Exam  Constitutional:       Appearance: She is not ill-appearing.   HENT:      Head: Normocephalic.      Right Ear: External ear normal.      Left Ear: External ear normal.   Eyes:      Conjunctiva/sclera: Conjunctivae normal.      Pupils: Pupils are equal, round, and reactive to light.   Cardiovascular:      Rate and Rhythm: Normal rate and regular rhythm.      Pulses:           Radial pulses are 2+ on the right side and 2+ on the left side.        Dorsalis pedis pulses are 2+ on the right side and 2+ on the left side.      Heart sounds: Normal heart sounds.   Pulmonary:      Effort: Pulmonary effort is normal.      Breath sounds: Normal breath sounds.   Abdominal:      Tenderness: There is no abdominal tenderness. There is no right CVA tenderness or left CVA tenderness.   Musculoskeletal:      Cervical back: Normal range of motion and " neck supple.      Right lower leg: No edema.      Left lower leg: No edema.   Skin:     General: Skin is warm.      Capillary Refill: Capillary refill takes less than 2 seconds.   Neurological:      Mental Status: She is alert and oriented to person, place, and time.      Coordination: Coordination normal.      Gait: Gait normal.   Psychiatric:         Mood and Affect: Mood normal.         Behavior: Behavior normal.         Thought Content: Thought content normal.             Assessment / Plan      Assessment/Plan:   Diagnoses and all orders for this visit:    1. 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 History of kidney stones  3. Urinary tract infection without hematuria, site unspecified       - Will return to clinic in 3 weeks for POC urinalysis, urine culture, urine test for protein in urine       - To check label on medications at home, will complete rx for cephalexin if any left.  To contact clinic with any questions or concerns       - Stay well hydrated.      4. Earache on left  5. History of placement of ear tubes        - Referred to Dr Wolf, ENT.  If she does not receive a call to schedule an appointment by next week to let me know.       Follow Up:   Return if symptoms worsen or fail to improve.  Appointment scheduled for annual physical in December, 2024    Patient was given instructions and counseling regarding her condition or for health maintenance advice. Please see specific information pulled into the AVS if appropriate.       Primary Care [address] [facility]     Please note that portions of this note may have been completed with a voice recognition program. Efforts were made to edit dictation, but occasionally words are mistranscribed.

## 2024-09-11 ENCOUNTER — OFFICE VISIT (OUTPATIENT)
Dept: INTERNAL MEDICINE | Facility: CLINIC | Age: 24
End: 2024-09-11
Payer: COMMERCIAL

## 2024-09-11 VITALS
TEMPERATURE: 98.2 F | SYSTOLIC BLOOD PRESSURE: 118 MMHG | BODY MASS INDEX: 43.15 KG/M2 | HEIGHT: 65 IN | DIASTOLIC BLOOD PRESSURE: 78 MMHG | WEIGHT: 259 LBS | HEART RATE: 102 BPM | OXYGEN SATURATION: 99 %

## 2024-09-11 DIAGNOSIS — Z48.02 VISIT FOR SUTURE REMOVAL: Primary | ICD-10-CM

## 2024-09-11 DIAGNOSIS — N39.0 URINARY TRACT INFECTION WITHOUT HEMATURIA, SITE UNSPECIFIED: ICD-10-CM

## 2024-09-11 DIAGNOSIS — H92.02 EARACHE ON LEFT: ICD-10-CM

## 2024-09-11 PROCEDURE — 1159F MED LIST DOCD IN RCRD: CPT | Performed by: NURSE PRACTITIONER

## 2024-09-11 PROCEDURE — 1126F AMNT PAIN NOTED NONE PRSNT: CPT | Performed by: NURSE PRACTITIONER

## 2024-09-11 PROCEDURE — 1160F RVW MEDS BY RX/DR IN RCRD: CPT | Performed by: NURSE PRACTITIONER

## 2024-09-11 PROCEDURE — 99214 OFFICE O/P EST MOD 30 MIN: CPT | Performed by: NURSE PRACTITIONER

## 2024-09-11 NOTE — PROGRESS NOTES
Office Visit      Patient Name: Nina Munguia  : 2000   MRN: [id number]     Chief Complaint:    Chief Complaint   Patient presents with    Finger Injury     Stitch removal       History of Present Illness: Nina Munguia is a 24 y.o. female who is here today for follow up of stitches placed on left middle finger, 24.  She presented to [facility] ED after slicing her finger while cutting an avocado.  She bled profusely until stitches were placed.  No bleeding or drainage afterward.  No restriction in ROM.  No pain, swelling, discoloration of finger.      Finished antibiotic prescribed for UTI on 24.  Will return to clinic for repeat urinalysis, urine culture 30 days after completion of antibiotic.  Orders placed for urine tests.  No further urinary symptoms after completion of antibiotic.  No fever, chills, malaise, n/v.  Referred to Urology, congenital duplicated collecting system of kidney.     Starts a new job next week.  Saw ENT to establish care r/t left earache, h/o ear tube placement. Supposed to see them every 2 weeks.  Works in [place], appointments to be scheduled in [place]. Won't be able to schedule these appointments due to time and work constraints.      Subjective      I have reviewed and the following portions of the patient's history were updated as appropriate: past family history, past medical history, past social history, past surgical history and problem list.      Current Outpatient Medications:     EPINEPHrine (EPIPEN) 0.3 MG/0.3ML solution auto-injector injection, Inject  into the appropriate muscle as directed by prescriber., Disp: , Rfl:     Loryna 3-0.02 MG per tablet, Take 1 tablet by mouth once daily, Disp: 56 tablet, Rfl: 0    phentermine 37.5 MG capsule, Take 1 capsule by mouth Every Morning., Disp: 30 capsule, Rfl: 2    traZODone (DESYREL) 50 MG tablet, TAKE 1 TABLET BY MOUTH AT NIGHT AS NEEDED FOR SLEEP, Disp: 90 tablet, Rfl: 1    Allergies  "  Allergen Reactions    Banana Anaphylaxis    Cat Hair Extract Unknown - Low Severity     Nose stuffy, throat scratchy, eye swelling    Dust Mite Extract Unknown - Low Severity     Eye swelling, throat scratchy, stuffy nose.    Pineapple Swelling     Swelling in the throat      Metformin GI Intolerance    Tramadol Irritability    Milk-Related Compounds GI Intolerance       Objective     Physical Exam:  Vital Signs:   Vitals:    09/11/24 1019   BP: 118/78   Pulse: 102   Temp: 98.2 °F (36.8 °C)   SpO2: 99%   Weight: 117 kg (259 lb)   Height: 165.1 cm (65\")     Body mass index is 43.1 kg/m².         Physical Exam  Constitutional:       Appearance: She is not ill-appearing.   HENT:      Head: Normocephalic.      Right Ear: External ear normal.      Left Ear: External ear normal.   Eyes:      Conjunctiva/sclera: Conjunctivae normal.      Pupils: Pupils are equal, round, and reactive to light.   Cardiovascular:      Rate and Rhythm: Normal rate and regular rhythm.      Pulses:           Radial pulses are 2+ on the right side and 2+ on the left side.        Dorsalis pedis pulses are 2+ on the right side and 2+ on the left side.      Heart sounds: Normal heart sounds.   Pulmonary:      Effort: Pulmonary effort is normal.      Breath sounds: Normal breath sounds.   Musculoskeletal:      Cervical back: Normal range of motion and neck supple.      Right lower leg: No edema.      Left lower leg: No edema.   Skin:     General: Skin is warm.      Capillary Refill: Capillary refill takes less than 2 seconds.      Comments: Laceration of base of palmar surface of left middle finger healed well.     Neurological:      Mental Status: She is alert and oriented to person, place, and time.      Coordination: Coordination normal.      Gait: Gait normal.   Psychiatric:         Mood and Affect: Mood normal.         Behavior: Behavior normal.         Thought Content: Thought content normal.       Suture Removal    Date/Time: 9/11/2024 10:55 " AM    Performed by: Ana Harkins APRN  Authorized by: Ana Harkins APRN  Consent: Verbal consent obtained. Written consent not obtained.  Risks and benefits: risks, benefits and alternatives were discussed  Consent given by: patient  Patient understanding: patient states understanding of the procedure being performed  Required items: required blood products, implants, devices, and special equipment available  Patient identity confirmed: verbally with patient  Body area: upper extremity  Location details: left long finger  Wound Appearance: clean  Sutures Removed: 2  Patient tolerance: patient tolerated the procedure well with no immediate complications              Assessment / Plan      Assessment/Plan:   Diagnoses and all orders for this visit:    1. Visit for suture removal (Primary)  -     Suture Removal  - Keep hands clean. Wash with antibacterial soap and pat completely dry.     2. Earache on left        - Will find out if ENT available in [place] who accepts her insurance.  Will refer there, if possible.     3. Urinary tract infection without hematuria, site unspecified        - To have urinalysis and culture repeated at the end of this month. Orders placed, no appointment necessary.         - Appointment scheduled with Urology on 9/19/24 r/t congenital duplicate collecting system of the kidney.             Follow Up:   Return for Annual.    Patient was given instructions and counseling regarding her condition or for health maintenance advice. Please see specific information pulled into the AVS if appropriate.       [facility]     Please note that portions of this note may have been completed with a voice recognition program. Efforts were made to edit dictation, but occasionally words are mistranscribed.

## 2024-09-18 DIAGNOSIS — E28.2 PCOS (POLYCYSTIC OVARIAN SYNDROME): ICD-10-CM

## 2024-09-18 RX ORDER — DROSPIRENONE AND ETHINYL ESTRADIOL TABLETS 0.02-3(28)
1 KIT ORAL DAILY
Qty: 84 TABLET | Refills: 1 | Status: SHIPPED | OUTPATIENT
Start: 2024-09-18

## 2024-09-26 ENCOUNTER — TELEPHONE (OUTPATIENT)
Dept: INTERNAL MEDICINE | Facility: CLINIC | Age: 24
End: 2024-09-26
Payer: COMMERCIAL

## 2024-09-30 ENCOUNTER — CLINICAL SUPPORT (OUTPATIENT)
Dept: INTERNAL MEDICINE | Facility: CLINIC | Age: 24
End: 2024-09-30
Payer: COMMERCIAL

## 2024-09-30 DIAGNOSIS — Q62.5 CONGENITAL DUPLICATION OF COLLECTING SYSTEM OF KIDNEY: ICD-10-CM

## 2024-09-30 LAB
BILIRUB BLD-MCNC: NEGATIVE MG/DL
CLARITY, POC: CLEAR
COLOR UR: YELLOW
EXPIRATION DATE: ABNORMAL
EXPIRATION DATE: NORMAL
GLUCOSE UR STRIP-MCNC: NEGATIVE MG/DL
KETONES UR QL: NEGATIVE
LEUKOCYTE EST, POC: ABNORMAL
Lab: ABNORMAL
Lab: NORMAL
NITRITE UR-MCNC: NEGATIVE MG/ML
PH UR: 6 [PH] (ref 5–8)
POC CREATININE URINE: 100
POC MICROALBUMIN URINE: 10
PROT UR STRIP-MCNC: NEGATIVE MG/DL
RBC # UR STRIP: NEGATIVE /UL
SP GR UR: 1.01 (ref 1–1.03)
UROBILINOGEN UR QL: NORMAL

## 2024-09-30 PROCEDURE — 82044 UR ALBUMIN SEMIQUANTITATIVE: CPT | Performed by: NURSE PRACTITIONER

## 2024-09-30 PROCEDURE — 81003 URINALYSIS AUTO W/O SCOPE: CPT | Performed by: NURSE PRACTITIONER

## 2024-10-02 ENCOUNTER — PATIENT MESSAGE (OUTPATIENT)
Dept: INTERNAL MEDICINE | Facility: CLINIC | Age: 24
End: 2024-10-02
Payer: COMMERCIAL

## 2024-10-02 DIAGNOSIS — L65.9 HAIR LOSS: Primary | ICD-10-CM

## 2024-10-07 NOTE — TELEPHONE ENCOUNTER
From: Nina Munguia  To: Ana Torin  Sent: 10/2/2024 6:19 PM EDT  Subject: Hair falling out, PCOS    Hi there, I had a quick question. I know that me having PCOS I am to expect hair loss from my head. I am experiencing a crazy amount of hair loss the last few weeks. I am having to clean my hairbrush out several times a day, sweep my floors after I fix my hair in the room, it’s becoming concerning. Is there anything you can give me to help this be reduced? I have taken spironolactone before for PCOS along with my current birth control and it was a horrible experience with mood swings, zero help to hair loss on my head and hair reduction on my face. I’ve tried vitamins, drinks, etc to try and help this and I’m exhausted at this point lol. I’m sorry for the long message but I didn’t want to leave anything out.

## 2024-10-14 DIAGNOSIS — F51.04 PSYCHOPHYSIOLOGICAL INSOMNIA: ICD-10-CM

## 2024-10-14 RX ORDER — TRAZODONE HYDROCHLORIDE 50 MG/1
TABLET, FILM COATED ORAL
Qty: 90 TABLET | Refills: 1 | Status: SHIPPED | OUTPATIENT
Start: 2024-10-14 | End: 2024-10-14 | Stop reason: SDUPTHER

## 2024-10-14 RX ORDER — TRAZODONE HYDROCHLORIDE 100 MG/1
TABLET ORAL
Qty: 90 TABLET | Refills: 1 | Status: SHIPPED | OUTPATIENT
Start: 2024-10-14

## 2024-10-14 NOTE — TELEPHONE ENCOUNTER
Caller: Nina Munguia Marisela    Relationship: Self    Best call back number: [phone]    Requested Prescriptions:   Requested Prescriptions     Pending Prescriptions Disp Refills    traZODone (DESYREL) 50 MG tablet 90 tablet 1     Sig: TAKE 1 TABLET BY MOUTH AT NIGHT AS NEEDED FOR SLEEP        Pharmacy where request should be sent: [facility] [address] [place] [address] [phone] [facility] [phone] FX     Last office visit with prescribing clinician: 9/11/2024   Last telemedicine visit with prescribing clinician: Visit date not found   Next office visit with prescribing clinician: 12/2/2024     Additional details provided by patient: PT STATES SHE IS OUT BECAUSE SHE IS TAKING 2 PILLS A NIGHT AND NOT JUST ONE! PLEASE REFILL FOR 2 PILLS NIGHTLY    Does the patient have less than a 3 day supply:  [x] Yes  [] No    Would you like a call back once the refill request has been completed: [x] Yes [] No    If the office needs to give you a call back, can they leave a voicemail: [x] Yes [] No    Laly Junior Rep   10/14/24 09:18 EDT

## 2024-10-14 NOTE — TELEPHONE ENCOUNTER
Patient states that the pharmacy won't fill because it states only 1 a night and she takes two and they say it's too early for a refill.  She is going out of town tomorrow and needs it fixed today.  Please advise. Phone number verified.

## 2024-12-02 ENCOUNTER — OFFICE VISIT (OUTPATIENT)
Dept: INTERNAL MEDICINE | Facility: CLINIC | Age: 24
End: 2024-12-02
Payer: COMMERCIAL

## 2024-12-02 VITALS
OXYGEN SATURATION: 99 % | HEART RATE: 117 BPM | HEIGHT: 65 IN | WEIGHT: 261 LBS | SYSTOLIC BLOOD PRESSURE: 112 MMHG | BODY MASS INDEX: 43.49 KG/M2 | DIASTOLIC BLOOD PRESSURE: 72 MMHG | TEMPERATURE: 97.3 F

## 2024-12-02 DIAGNOSIS — F51.04 PSYCHOPHYSIOLOGICAL INSOMNIA: ICD-10-CM

## 2024-12-02 DIAGNOSIS — E55.9 VITAMIN D INSUFFICIENCY: ICD-10-CM

## 2024-12-02 DIAGNOSIS — E66.01 MORBID OBESITY: ICD-10-CM

## 2024-12-02 DIAGNOSIS — E28.2 PCOS (POLYCYSTIC OVARIAN SYNDROME): ICD-10-CM

## 2024-12-02 DIAGNOSIS — Z00.00 ENCOUNTER FOR ANNUAL PHYSICAL EXAMINATION EXCLUDING GYNECOLOGICAL EXAMINATION IN A PATIENT OLDER THAN 17 YEARS: Primary | ICD-10-CM

## 2024-12-02 DIAGNOSIS — Z13.29 SCREENING FOR ENDOCRINE, METABOLIC AND IMMUNITY DISORDER: ICD-10-CM

## 2024-12-02 DIAGNOSIS — Z13.1 SCREENING FOR DIABETES MELLITUS: ICD-10-CM

## 2024-12-02 DIAGNOSIS — Z13.0 SCREENING FOR ENDOCRINE, METABOLIC AND IMMUNITY DISORDER: ICD-10-CM

## 2024-12-02 DIAGNOSIS — Z13.228 SCREENING FOR ENDOCRINE, METABOLIC AND IMMUNITY DISORDER: ICD-10-CM

## 2024-12-02 PROCEDURE — 1126F AMNT PAIN NOTED NONE PRSNT: CPT | Performed by: NURSE PRACTITIONER

## 2024-12-02 PROCEDURE — 1159F MED LIST DOCD IN RCRD: CPT | Performed by: NURSE PRACTITIONER

## 2024-12-02 PROCEDURE — 2014F MENTAL STATUS ASSESS: CPT | Performed by: NURSE PRACTITIONER

## 2024-12-02 PROCEDURE — 1160F RVW MEDS BY RX/DR IN RCRD: CPT | Performed by: NURSE PRACTITIONER

## 2024-12-02 PROCEDURE — 99395 PREV VISIT EST AGE 18-39: CPT | Performed by: NURSE PRACTITIONER

## 2024-12-02 RX ORDER — PHENTERMINE HYDROCHLORIDE 37.5 MG/1
37.5 CAPSULE ORAL EVERY MORNING
Qty: 30 CAPSULE | Refills: 2 | Status: SHIPPED | OUTPATIENT
Start: 2024-12-02

## 2024-12-02 NOTE — PROGRESS NOTES
Chief Complaint   Patient presents with    Annual Exam       Nina Munguia is a 24 y.o. female and is here for a comprehensive physical exam.     Worries about hypoglycemia.  Has throbbing headaches in the back of her head over the past year.  If headache progresses is associated with diaphoresis and shaking. Eating improves symptoms.  Not fasting purposefully but does have times when she does not eat regularly.     Morbidly obese.  Has taken phentermine 37.5 mg in the past, curbs her appetite.       History:  LMP: end . (Menstrual status: Oral contraceptives).  Menarche: 14 years old  Sexual activity:  Never   Last pap date: never, follows gynecology  Abnormal pap? no  : 0  Para: 0     Do you take any herbs or supplements that were not prescribed by a doctor? CoQ10, zinc, cinnamon, turmeric     Health Habits:  Dental Exam. not up to date.  Brushes teeth twice a day  Eye Exam. up to date.  Wears glasses.  Exercise: 0 times/week.  Current exercise activities include: hiking, lifting at work several days a week   Diet: well balanced, loves vegetables    Health Maintenance   Topic Date Due    COVID-19 Vaccine (2024- season) 2025 (Originally 2024)    INFLUENZA VACCINE  2025 (Originally 2024)    BMI FOLLOWUP  2025    ANNUAL PHYSICAL  12/10/2025    PAP SMEAR  2026    TDAP/TD VACCINES (3 - Td or Tdap) 2034    HEPATITIS C SCREENING  Completed    HPV VACCINES  Completed    Pneumococcal Vaccine 0-64  Aged Out    CHLAMYDIA SCREENING  Discontinued       PMH, PSH, SocHx, FamHx, Allergies, and Medications: Reviewed and updated in the Visit Navigator.     Allergies   Allergen Reactions    Banana Anaphylaxis    Cat Hair Extract Unknown - Low Severity     Nose stuffy, throat scratchy, eye swelling    Dust Mite Extract Unknown - Low Severity     Eye swelling, throat scratchy, stuffy nose.    Pineapple Swelling     Swelling in the throat      Metformin GI  Intolerance    Tramadol Irritability    Milk-Related Compounds GI Intolerance     Past Medical History:   Diagnosis Date    ADHD (attention deficit hyperactivity disorder)     I dont know exact date, i was in the 3rd grade    Allergic     seasonal allergies    Asthma     Cholelithiasis 2021    Gallstones    Hypertension     Just in the emergency room.  She reports normal in PCP office.    Kidney infection     Kidney stone 5/2023    Obesity     PCOS (polycystic ovarian syndrome)     Piercing     nose piercing    Polycystic ovary syndrome     I was 13 or 14 when diagnosed.    Tattoo     UTI (urinary tract infection)      Past Surgical History:   Procedure Laterality Date    CHOLECYSTECTOMY  2/17/2022    I know it was in [facility]    CHOLECYSTECTOMY WITH INTRAOPERATIVE CHOLANGIOGRAM N/A 02/23/2022    Procedure: CHOLECYSTECTOMY LAPAROSCOPIC INTRAOPERATIVE CHOLANGIOGRAPHY;  Surgeon: Vernon Avila MD;  Location: [facility];  Service: General;  Laterality: N/A;    EAR TUBES      LAPAROSCOPIC CHOLECYSTECTOMY  2/2022    WISDOM TOOTH EXTRACTION       Social History     Socioeconomic History    Marital status: Single   Tobacco Use    Smoking status: Never    Smokeless tobacco: Never    Tobacco comments:     During covid is when i started and stopped aftwr around a year   Vaping Use    Vaping status: Former    Quit date: 1/1/2020    Substances: Nicotine    Devices: Disposable   Substance and Sexual Activity    Alcohol use: Not Currently     Comment: Have a drink everyonce and a while    Drug use: Never    Sexual activity: Never     Birth control/protection: Birth control pill     Family History   Problem Relation Age of Onset    Sleep apnea Mother     Obesity Mother     COPD Mother     Breast cancer Mother 47    Anxiety disorder Mother     Cancer Mother         Breast cancer    Depression Mother     Mental illness Mother     Asthma Brother     Irritable bowel syndrome Brother     Prostate cancer Maternal Grandfather      "Alcohol abuse Maternal Grandfather     Diabetes Paternal Grandmother     Sleep apnea Maternal Aunt     Heart disease Maternal Aunt     Arthritis Maternal Aunt     Drug abuse Maternal Aunt     Kidney disease Maternal Aunt     Colon cancer Neg Hx     Ovarian cancer Neg Hx     Uterine cancer Neg Hx        Review of Systems  Review of Systems   All other systems reviewed and are negative.      Objective   /72   Pulse 117   Temp 97.3 °F (36.3 °C)   Ht 165.1 cm (65\")   Wt 118 kg (261 lb)   SpO2 99%   BMI 43.43 kg/m²   Body mass index is 43.43 kg/m².    Physical Exam  Constitutional:       Appearance: She is well-developed. She is morbidly obese. She is not ill-appearing.   HENT:      Head: Normocephalic.      Right Ear: Tympanic membrane, ear canal and external ear normal.      Left Ear: Tympanic membrane, ear canal and external ear normal.      Nose: Nose normal.      Mouth/Throat:      Mouth: Mucous membranes are moist.      Pharynx: Oropharynx is clear. Uvula midline.   Eyes:      Extraocular Movements: Extraocular movements intact.      Conjunctiva/sclera: Conjunctivae normal.      Pupils: Pupils are equal, round, and reactive to light.   Neck:      Thyroid: No thyromegaly.      Vascular: No carotid bruit.   Cardiovascular:      Rate and Rhythm: Normal rate and regular rhythm.      Pulses:           Radial pulses are 2+ on the right side and 2+ on the left side.        Dorsalis pedis pulses are 2+ on the right side and 2+ on the left side.      Heart sounds: Normal heart sounds.   Pulmonary:      Effort: Pulmonary effort is normal.      Breath sounds: Normal breath sounds.   Abdominal:      General: Bowel sounds are normal.      Palpations: Abdomen is soft.      Tenderness: There is no abdominal tenderness.   Musculoskeletal:         General: No tenderness or deformity. Normal range of motion.      Cervical back: Full passive range of motion without pain, normal range of motion and neck supple.      Right " lower leg: No edema.      Left lower leg: No edema.   Lymphadenopathy:      Cervical: No cervical adenopathy.   Skin:     General: Skin is warm.      Capillary Refill: Capillary refill takes less than 2 seconds.   Neurological:      Mental Status: She is alert and oriented to person, place, and time.      Sensory: No sensory deficit.      Coordination: Coordination normal.      Gait: Gait normal.      Comments: CN II-XII normal   Psychiatric:         Attention and Perception: Attention normal.         Mood and Affect: Mood and affect normal.         Speech: Speech normal.         Behavior: Behavior normal.         Thought Content: Thought content normal.         Assessment & Plan   1. Healthy female exam.    2. Patient Counseling: Including but not Limited to the following, when appropriate:  --Nutrition: Stressed importance of moderation in sodium/caffeine intake, saturated fat and cholesterol, caloric balance, sufficient intake of fresh fruits, vegetables, fiber, calcium, iron, and 1 mg of folate supplement per day (for females capable of pregnancy).  --Exercise: Stressed the importance of regular exercise.   --Substance Abuse: Discussed cessation/primary prevention of tobacco, alcohol, or other drug use; driving or other dangerous activities under the influence; availability of treatment for abuse, as indicated based on social history.    --Sexuality: Discussed sexually transmitted diseases, partner selection, use of condoms, avoidance of unintended pregnancy  and contraceptive alternatives.   --Injury prevention: Discussed safety belts, safety helmets, smoke detector, smoking near bedding or upholstery.   --Dental health: Discussed importance of regular tooth brushing, flossing, and dental visits.  --Immunizations reviewed.  3. Discussed the patient's BMI with her.  The BMI is above average; BMI management plan is completed  4. Return in about 3 months (around 3/2/2025) for Next scheduled follow up.  5.  Age-appropriate Screening Scheduled    Assessment & Plan     Diagnoses and all orders for this visit:    1. Encounter for annual physical examination excluding gynecological examination in a patient older than 17 years (Primary)    2. PCOS (polycystic ovarian syndrome)    3. Screening for diabetes mellitus  -     Hemoglobin A1c    4. Psychophysiological insomnia        - Encouraged to take part in daily physical exercise.          - Eat healthy, well balanced diet; avoid sugary foods or beverages        - Limit alcohol intake        - Ensure good night's sleep by creating calm space in bedroom, avoiding screen time 1-2 hours before bed, no caffeine after 5 pm    5. Vitamin D insufficiency  -     Vitamin D,25-Hydroxy    6. Morbid obesity  -     phentermine 37.5 MG capsule; Take 1 capsule by mouth Every Morning.  Dispense: 30 capsule; Refill: 2.      7. Screening for endocrine, metabolic and immunity disorder  -     Comprehensive Metabolic Panel  -     T4, Free  -     TSH

## 2024-12-07 ENCOUNTER — LAB (OUTPATIENT)
Dept: LAB | Facility: HOSPITAL | Age: 24
End: 2024-12-07
Payer: COMMERCIAL

## 2025-03-05 DIAGNOSIS — E28.2 PCOS (POLYCYSTIC OVARIAN SYNDROME): ICD-10-CM

## 2025-03-05 RX ORDER — DROSPIRENONE AND ETHINYL ESTRADIOL TABLETS 0.02-3(28)
1 KIT ORAL DAILY
Qty: 84 TABLET | Refills: 0 | Status: SHIPPED | OUTPATIENT
Start: 2025-03-05

## 2025-03-05 NOTE — TELEPHONE ENCOUNTER
Rx Refill Note   With Obstetrics and Gynecology  08/15/2025 at 9:30 AM   12/2/2024 Ana Harkins APRN     Requested Prescriptions     Pending Prescriptions Disp Refills    Loryna 3-0.02 MG per tablet [Pharmacy Med Name: Loryna 3-0.02 MG Oral Tablet] 84 tablet 0     Sig: Take 1 tablet by mouth once daily      Last office visit with prescribing clinician: 11/16/2023   Last telemedicine visit with prescribing clinician: Visit date not found   Next office visit with prescribing clinician: Visit date not found                         Would you like a call back once the refill request has been completed: [] Yes [] No    If the office needs to give you a call back, can they leave a voicemail: [] Yes [] No    Chris Willoughby CMA  03/05/25, 13:23 EST

## 2025-03-30 DIAGNOSIS — F51.04 PSYCHOPHYSIOLOGICAL INSOMNIA: ICD-10-CM

## 2025-03-31 RX ORDER — TRAZODONE HYDROCHLORIDE 100 MG/1
100 TABLET ORAL NIGHTLY PRN
Qty: 90 TABLET | Refills: 0 | Status: SHIPPED | OUTPATIENT
Start: 2025-03-31

## 2025-05-04 DIAGNOSIS — E66.01 MORBID OBESITY: ICD-10-CM

## 2025-05-05 DIAGNOSIS — E66.01 MORBID OBESITY: ICD-10-CM

## 2025-05-05 RX ORDER — PHENTERMINE HYDROCHLORIDE 37.5 MG/1
37.5 CAPSULE ORAL EVERY MORNING
Qty: 30 CAPSULE | Refills: 2 | OUTPATIENT
Start: 2025-05-05

## 2025-05-05 RX ORDER — PHENTERMINE HYDROCHLORIDE 37.5 MG/1
37.5 CAPSULE ORAL EVERY MORNING
Qty: 30 CAPSULE | Refills: 0 | OUTPATIENT
Start: 2025-05-05

## 2025-05-05 NOTE — TELEPHONE ENCOUNTER
Rx Refill Note  Requested Prescriptions     Pending Prescriptions Disp Refills    phentermine 37.5 MG capsule [Pharmacy Med Name: Phentermine HCl 37.5 MG Oral Capsule] 30 capsule 0     Sig: Take 1 capsule by mouth once daily in the morning      Last office visit with prescribing clinician: 12/2/2024   Last telemedicine visit with prescribing clinician: Visit date not found   Next office visit with prescribing clinician: Visit date not found.    Return in about 3 months (around 3/2/2025) for Next scheduled follow up.     Jessica Farmer MA  05/05/25, 09:53 EDT

## 2025-05-05 NOTE — TELEPHONE ENCOUNTER
Rx Refill Note  Requested Prescriptions     Pending Prescriptions Disp Refills    phentermine 37.5 MG capsule 30 capsule 2     Sig: Take 1 capsule by mouth Every Morning.      Last office visit with prescribing clinician: 12/2/2024   Last telemedicine visit with prescribing clinician: Visit date not found   Next office visit with prescribing clinician: Visit date not found                         Would you like a call back once the refill request has been completed: [] Yes [] No    If the office needs to give you a call back, can they leave a voicemail: [] Yes [] No    Jane Varner MA  05/05/25, 18:10 EDT    Gabapentin Counseling: I discussed with the patient the risks of gabapentin including but not limited to dizziness, somnolence, fatigue and ataxia.

## 2025-05-26 DIAGNOSIS — E28.2 PCOS (POLYCYSTIC OVARIAN SYNDROME): ICD-10-CM

## 2025-05-27 RX ORDER — DROSPIRENONE AND ETHINYL ESTRADIOL TABLETS 0.02-3(28)
1 KIT ORAL DAILY
Qty: 84 TABLET | Refills: 0 | Status: SHIPPED | OUTPATIENT
Start: 2025-05-27

## 2025-05-27 NOTE — TELEPHONE ENCOUNTER
Rx Refill Note  Requested Prescriptions     Pending Prescriptions Disp Refills    Loryna 3-0.02 MG per tablet [Pharmacy Med Name: Loryna 3-0.02 MG Oral Tablet] 84 tablet 0     Sig: Take 1 tablet by mouth once daily      Last office visit with prescribing clinician: 12/2/2024   Last telemedicine visit with prescribing clinician: Visit date not found   Next office visit with prescribing clinician: Visit date not found                         Would you like a call back once the refill request has been completed: [] Yes [] No    If the office needs to give you a call back, can they leave a voicemail: [] Yes [] No    Roger Napoles MA  05/27/25, 08:35 EDT

## 2025-06-21 DIAGNOSIS — F51.04 PSYCHOPHYSIOLOGICAL INSOMNIA: ICD-10-CM

## 2025-06-23 RX ORDER — TRAZODONE HYDROCHLORIDE 100 MG/1
100 TABLET ORAL NIGHTLY PRN
Qty: 90 TABLET | Refills: 1 | Status: SHIPPED | OUTPATIENT
Start: 2025-06-23

## 2025-08-15 ENCOUNTER — OFFICE VISIT (OUTPATIENT)
Dept: OBSTETRICS AND GYNECOLOGY | Facility: CLINIC | Age: 25
End: 2025-08-15
Payer: COMMERCIAL

## 2025-08-15 VITALS
WEIGHT: 273 LBS | BODY MASS INDEX: 45.48 KG/M2 | HEIGHT: 65 IN | DIASTOLIC BLOOD PRESSURE: 80 MMHG | SYSTOLIC BLOOD PRESSURE: 114 MMHG

## 2025-08-15 DIAGNOSIS — Z30.41 ENCOUNTER FOR SURVEILLANCE OF CONTRACEPTIVE PILLS: ICD-10-CM

## 2025-08-15 DIAGNOSIS — Z01.419 WOMEN'S ANNUAL ROUTINE GYNECOLOGICAL EXAMINATION: Primary | ICD-10-CM

## 2025-08-15 DIAGNOSIS — E28.2 PCOS (POLYCYSTIC OVARIAN SYNDROME): ICD-10-CM

## 2025-08-15 RX ORDER — MULTIPLE VITAMINS W/ MINERALS TAB 9MG-400MCG
1 TAB ORAL DAILY
COMMUNITY

## 2025-08-15 RX ORDER — DROSPIRENONE AND ETHINYL ESTRADIOL 0.02-3(28)
1 KIT ORAL DAILY
Qty: 84 TABLET | Refills: 3 | Status: SHIPPED | OUTPATIENT
Start: 2025-08-15

## (undated) DEVICE — UNDYED BRAIDED (POLYGLACTIN 910), SYNTHETIC ABSORBABLE SUTURE: Brand: COATED VICRYL

## (undated) DEVICE — CLAVICLE STRAP: Brand: DEROYAL

## (undated) DEVICE — ENDOPOUCH RETRIEVER SPECIMEN RETRIEVAL BAGS: Brand: ENDOPOUCH RETRIEVER

## (undated) DEVICE — GLV SURG SENSICARE W/ALOE PF LF 8.5 STRL

## (undated) DEVICE — MONOPOLAR METZENBAUM SCISSOR, MINI BLADE TIP, DISPOSABLE: Brand: MONOPOLAR METZENBAUM SCISSOR, MINI BLADE TIP, DISPOSABLE

## (undated) DEVICE — KT CATH CHOLANGIOGRA PERC W/BALLO

## (undated) DEVICE — BLD CLIP UNIV SURG GRY

## (undated) DEVICE — TP ELECTRD LAP L WR SPLIT33CM

## (undated) DEVICE — ENDOPATH XCEL UNIVERSAL TROCAR STABLILITY SLEEVES: Brand: ENDOPATH XCEL

## (undated) DEVICE — 2, DISPOSABLE SUCTION/IRRIGATOR WITHOUT DISPOSABLE TIP: Brand: STRYKEFLOW

## (undated) DEVICE — DISPOSABLE MONOPOLAR ENDOSCOPIC CORD 10 FT. (3M): Brand: KIRWAN

## (undated) DEVICE — RICH GENERAL LAPAROSCOPY: Brand: MEDLINE INDUSTRIES, INC.

## (undated) DEVICE — SLV SCD CALF HEMOFORCE DVT THERP REPROC MD

## (undated) DEVICE — ENDOPATH XCEL BLADELESS TROCARS WITH STABILITY SLEEVES: Brand: ENDOPATH XCEL